# Patient Record
Sex: FEMALE | Race: WHITE | NOT HISPANIC OR LATINO | URBAN - METROPOLITAN AREA
[De-identification: names, ages, dates, MRNs, and addresses within clinical notes are randomized per-mention and may not be internally consistent; named-entity substitution may affect disease eponyms.]

---

## 2017-01-20 ENCOUNTER — OUTPATIENT (OUTPATIENT)
Dept: OUTPATIENT SERVICES | Facility: HOSPITAL | Age: 37
LOS: 1 days | End: 2017-01-20

## 2017-01-22 ENCOUNTER — OUTPATIENT (OUTPATIENT)
Dept: OUTPATIENT SERVICES | Facility: HOSPITAL | Age: 37
LOS: 1 days | End: 2017-01-22
Payer: COMMERCIAL

## 2017-01-22 PROCEDURE — 99214 OFFICE O/P EST MOD 30 MIN: CPT

## 2017-01-25 ENCOUNTER — OUTPATIENT (OUTPATIENT)
Dept: OUTPATIENT SERVICES | Facility: HOSPITAL | Age: 37
LOS: 1 days | End: 2017-01-25
Payer: COMMERCIAL

## 2017-01-25 LAB
ALBUMIN SERPL ELPH-MCNC: 2.9 G/DL — LOW (ref 3.4–5)
ALP SERPL-CCNC: 94 U/L — SIGNIFICANT CHANGE UP (ref 40–120)
ALT FLD-CCNC: 19 U/L — SIGNIFICANT CHANGE UP (ref 12–42)
AMYLASE P1 CFR SERPL: 57 U/L — SIGNIFICANT CHANGE UP (ref 25–115)
ANION GAP SERPL CALC-SCNC: 12 MMOL/L — SIGNIFICANT CHANGE UP (ref 9–16)
AST SERPL-CCNC: 21 U/L — SIGNIFICANT CHANGE UP (ref 15–37)
BILIRUB SERPL-MCNC: 0.5 MG/DL — SIGNIFICANT CHANGE UP (ref 0.2–1.2)
BUN SERPL-MCNC: 13 MG/DL — SIGNIFICANT CHANGE UP (ref 7–23)
CALCIUM SERPL-MCNC: 8.7 MG/DL — SIGNIFICANT CHANGE UP (ref 8.5–10.5)
CHLORIDE SERPL-SCNC: 103 MMOL/L — SIGNIFICANT CHANGE UP (ref 96–108)
CO2 SERPL-SCNC: 22 MMOL/L — SIGNIFICANT CHANGE UP (ref 22–31)
CREAT SERPL-MCNC: 0.5 MG/DL — SIGNIFICANT CHANGE UP (ref 0.5–1.3)
GLUCOSE SERPL-MCNC: 88 MG/DL — SIGNIFICANT CHANGE UP (ref 70–99)
HCT VFR BLD CALC: 36.3 % — SIGNIFICANT CHANGE UP (ref 34.5–45)
HGB BLD-MCNC: 12.3 G/DL — SIGNIFICANT CHANGE UP (ref 11.5–15.5)
LIDOCAIN IGE QN: 218 U/L — SIGNIFICANT CHANGE UP (ref 73–393)
MCHC RBC-ENTMCNC: 29.8 PG — SIGNIFICANT CHANGE UP (ref 27–34)
MCHC RBC-ENTMCNC: 33.9 G/DL — SIGNIFICANT CHANGE UP (ref 32–36)
MCV RBC AUTO: 87.9 FL — SIGNIFICANT CHANGE UP (ref 80–100)
PLATELET # BLD AUTO: 236 K/UL — SIGNIFICANT CHANGE UP (ref 150–400)
POTASSIUM SERPL-MCNC: 3.7 MMOL/L — SIGNIFICANT CHANGE UP (ref 3.5–5.3)
POTASSIUM SERPL-SCNC: 3.7 MMOL/L — SIGNIFICANT CHANGE UP (ref 3.5–5.3)
PROT SERPL-MCNC: 7.4 G/DL — SIGNIFICANT CHANGE UP (ref 6.4–8.2)
RBC # BLD: 4.13 M/UL — SIGNIFICANT CHANGE UP (ref 3.8–5.2)
RBC # FLD: 14.3 % — SIGNIFICANT CHANGE UP (ref 10.3–16.9)
SODIUM SERPL-SCNC: 137 MMOL/L — SIGNIFICANT CHANGE UP (ref 135–145)
WBC # BLD: 13.9 K/UL — HIGH (ref 3.8–10.5)
WBC # FLD AUTO: 13.9 K/UL — HIGH (ref 3.8–10.5)

## 2017-01-25 PROCEDURE — 80053 COMPREHEN METABOLIC PANEL: CPT

## 2017-01-25 PROCEDURE — 83690 ASSAY OF LIPASE: CPT

## 2017-01-25 PROCEDURE — 82150 ASSAY OF AMYLASE: CPT

## 2017-01-25 PROCEDURE — 85027 COMPLETE CBC AUTOMATED: CPT

## 2017-01-25 PROCEDURE — 99214 OFFICE O/P EST MOD 30 MIN: CPT

## 2017-01-25 PROCEDURE — 36415 COLL VENOUS BLD VENIPUNCTURE: CPT

## 2017-01-25 RX ORDER — SODIUM CHLORIDE 9 MG/ML
1000 INJECTION, SOLUTION INTRAVENOUS
Qty: 0 | Refills: 0 | Status: DISCONTINUED | OUTPATIENT
Start: 2017-01-25 | End: 2017-02-09

## 2017-02-04 ENCOUNTER — INPATIENT (INPATIENT)
Facility: HOSPITAL | Age: 37
LOS: 5 days | Discharge: HOME | End: 2017-02-10
Attending: OBSTETRICS & GYNECOLOGY | Admitting: OBSTETRICS & GYNECOLOGY
Payer: COMMERCIAL

## 2017-02-04 VITALS — HEIGHT: 64 IN | WEIGHT: 228.4 LBS

## 2017-02-04 RX ORDER — OXYTOCIN 10 UNIT/ML
1 VIAL (ML) INJECTION
Qty: 30 | Refills: 0 | Status: DISCONTINUED | OUTPATIENT
Start: 2017-02-04 | End: 2017-02-10

## 2017-02-04 RX ORDER — LEVOTHYROXINE SODIUM 125 MCG
1 TABLET ORAL
Qty: 0 | Refills: 0 | COMMUNITY

## 2017-02-04 RX ORDER — SODIUM CHLORIDE 9 MG/ML
1000 INJECTION, SOLUTION INTRAVENOUS
Qty: 0 | Refills: 0 | Status: DISCONTINUED | OUTPATIENT
Start: 2017-02-04 | End: 2017-02-05

## 2017-02-04 RX ORDER — CITRIC ACID/SODIUM CITRATE 300-500 MG
15 SOLUTION, ORAL ORAL EVERY 4 HOURS
Qty: 0 | Refills: 0 | Status: DISCONTINUED | OUTPATIENT
Start: 2017-02-04 | End: 2017-02-05

## 2017-02-04 RX ORDER — SODIUM CHLORIDE 9 MG/ML
2000 INJECTION, SOLUTION INTRAVENOUS ONCE
Qty: 0 | Refills: 0 | Status: DISCONTINUED | OUTPATIENT
Start: 2017-02-04 | End: 2017-02-05

## 2017-02-04 RX ORDER — OXYTOCIN 10 UNIT/ML
333.33 VIAL (ML) INJECTION
Qty: 20 | Refills: 0 | Status: DISCONTINUED | OUTPATIENT
Start: 2017-02-04 | End: 2017-02-05

## 2017-02-04 RX ADMIN — SODIUM CHLORIDE 125 MILLILITER(S): 9 INJECTION, SOLUTION INTRAVENOUS at 23:10

## 2017-02-04 NOTE — PATIENT PROFILE OB - PRENATAL LABORATORY TESTS, INFANT PROFILE
antibody screen/blood type antibody screen/VDRL/RPR/group B strep/blood type/rubella antibody screen/HIV/HBsAG/group B strep/rubella/blood type/VDRL/RPR

## 2017-02-05 LAB
BASOPHILS NFR BLD AUTO: 0.1 % — SIGNIFICANT CHANGE UP (ref 0–2)
BLD GP AB SCN SERPL QL: NEGATIVE — SIGNIFICANT CHANGE UP
BLD GP AB SCN SERPL QL: NEGATIVE — SIGNIFICANT CHANGE UP
EOSINOPHIL NFR BLD AUTO: 0.9 % — SIGNIFICANT CHANGE UP (ref 0–6)
HBV SURFACE AG SER-ACNC: SIGNIFICANT CHANGE UP
HCT VFR BLD CALC: 34.4 % — LOW (ref 34.5–45)
HGB BLD-MCNC: 11.8 G/DL — SIGNIFICANT CHANGE UP (ref 11.5–15.5)
HIV 1+2 AB+HIV1 P24 AG SERPL QL IA: SIGNIFICANT CHANGE UP
LYMPHOCYTES # BLD AUTO: 22.3 % — SIGNIFICANT CHANGE UP (ref 13–44)
MCHC RBC-ENTMCNC: 30.6 PG — SIGNIFICANT CHANGE UP (ref 27–34)
MCHC RBC-ENTMCNC: 34.3 G/DL — SIGNIFICANT CHANGE UP (ref 32–36)
MCV RBC AUTO: 89.4 FL — SIGNIFICANT CHANGE UP (ref 80–100)
MONOCYTES NFR BLD AUTO: 9.7 % — SIGNIFICANT CHANGE UP (ref 2–14)
NEUTROPHILS NFR BLD AUTO: 67 % — SIGNIFICANT CHANGE UP (ref 43–77)
PLATELET # BLD AUTO: 250 K/UL — SIGNIFICANT CHANGE UP (ref 150–400)
RBC # BLD: 3.85 M/UL — SIGNIFICANT CHANGE UP (ref 3.8–5.2)
RBC # FLD: 14.5 % — SIGNIFICANT CHANGE UP (ref 10.3–16.9)
RH IG SCN BLD-IMP: POSITIVE — SIGNIFICANT CHANGE UP
RH IG SCN BLD-IMP: POSITIVE — SIGNIFICANT CHANGE UP
WBC # BLD: 11.7 K/UL — HIGH (ref 3.8–10.5)
WBC # FLD AUTO: 11.7 K/UL — HIGH (ref 3.8–10.5)

## 2017-02-05 RX ORDER — ZOLPIDEM TARTRATE 10 MG/1
5 TABLET ORAL AT BEDTIME
Qty: 0 | Refills: 0 | Status: DISCONTINUED | OUTPATIENT
Start: 2017-02-05 | End: 2017-02-07

## 2017-02-05 RX ORDER — ONDANSETRON 8 MG/1
8 TABLET, FILM COATED ORAL ONCE
Qty: 0 | Refills: 0 | Status: COMPLETED | OUTPATIENT
Start: 2017-02-05 | End: 2017-02-05

## 2017-02-05 RX ORDER — FAMOTIDINE 10 MG/ML
20 INJECTION INTRAVENOUS EVERY 12 HOURS
Qty: 0 | Refills: 0 | Status: DISCONTINUED | OUTPATIENT
Start: 2017-02-05 | End: 2017-02-10

## 2017-02-05 RX ORDER — SODIUM CHLORIDE 9 MG/ML
1000 INJECTION, SOLUTION INTRAVENOUS
Qty: 0 | Refills: 0 | Status: DISCONTINUED | OUTPATIENT
Start: 2017-02-05 | End: 2017-02-06

## 2017-02-05 RX ADMIN — SODIUM CHLORIDE 125 MILLILITER(S): 9 INJECTION, SOLUTION INTRAVENOUS at 23:27

## 2017-02-05 RX ADMIN — Medication 1 MILLIUNIT(S)/MIN: at 00:34

## 2017-02-05 RX ADMIN — ZOLPIDEM TARTRATE 5 MILLIGRAM(S): 10 TABLET ORAL at 13:01

## 2017-02-05 RX ADMIN — FAMOTIDINE 20 MILLIGRAM(S): 10 INJECTION INTRAVENOUS at 23:21

## 2017-02-05 RX ADMIN — SODIUM CHLORIDE 125 MILLILITER(S): 9 INJECTION, SOLUTION INTRAVENOUS at 19:51

## 2017-02-05 RX ADMIN — ONDANSETRON 8 MILLIGRAM(S): 8 TABLET, FILM COATED ORAL at 02:24

## 2017-02-06 DIAGNOSIS — O47.1 FALSE LABOR AT OR AFTER 37 COMPLETED WEEKS OF GESTATION: ICD-10-CM

## 2017-02-06 LAB
HCT VFR BLD CALC: 23.3 % — LOW (ref 34.5–45)
HGB BLD-MCNC: 7.7 G/DL — LOW (ref 11.5–15.5)
MCHC RBC-ENTMCNC: 29.5 PG — SIGNIFICANT CHANGE UP (ref 27–34)
MCHC RBC-ENTMCNC: 33 G/DL — SIGNIFICANT CHANGE UP (ref 32–36)
MCV RBC AUTO: 89.3 FL — SIGNIFICANT CHANGE UP (ref 80–100)
PLATELET # BLD AUTO: 212 K/UL — SIGNIFICANT CHANGE UP (ref 150–400)
RBC # BLD: 2.61 M/UL — LOW (ref 3.8–5.2)
RBC # FLD: 13.6 % — SIGNIFICANT CHANGE UP (ref 10.3–16.9)
T PALLIDUM AB TITR SER: NEGATIVE — SIGNIFICANT CHANGE UP
WBC # BLD: 17.6 K/UL — HIGH (ref 3.8–10.5)
WBC # FLD AUTO: 17.6 K/UL — HIGH (ref 3.8–10.5)

## 2017-02-06 RX ORDER — NALOXONE HYDROCHLORIDE 4 MG/.1ML
0.1 SPRAY NASAL
Qty: 0 | Refills: 0 | Status: DISCONTINUED | OUTPATIENT
Start: 2017-02-06 | End: 2017-02-10

## 2017-02-06 RX ORDER — OXYTOCIN 10 UNIT/ML
41.67 VIAL (ML) INJECTION
Qty: 20 | Refills: 0 | Status: DISCONTINUED | OUTPATIENT
Start: 2017-02-06 | End: 2017-02-10

## 2017-02-06 RX ORDER — CEFAZOLIN SODIUM 1 G
2000 VIAL (EA) INJECTION ONCE
Qty: 0 | Refills: 0 | Status: COMPLETED | OUTPATIENT
Start: 2017-02-06 | End: 2017-02-06

## 2017-02-06 RX ORDER — SODIUM CHLORIDE 9 MG/ML
500 INJECTION, SOLUTION INTRAVENOUS ONCE
Qty: 0 | Refills: 0 | Status: COMPLETED | OUTPATIENT
Start: 2017-02-06 | End: 2017-02-06

## 2017-02-06 RX ORDER — ONDANSETRON 8 MG/1
4 TABLET, FILM COATED ORAL EVERY 6 HOURS
Qty: 0 | Refills: 0 | Status: DISCONTINUED | OUTPATIENT
Start: 2017-02-06 | End: 2017-02-10

## 2017-02-06 RX ORDER — MORPHINE SULFATE 50 MG/1
4 CAPSULE, EXTENDED RELEASE ORAL ONCE
Qty: 0 | Refills: 0 | Status: DISCONTINUED | OUTPATIENT
Start: 2017-02-06 | End: 2017-02-10

## 2017-02-06 RX ORDER — IBUPROFEN 200 MG
600 TABLET ORAL ONCE
Qty: 0 | Refills: 0 | Status: COMPLETED | OUTPATIENT
Start: 2017-02-06 | End: 2017-02-06

## 2017-02-06 RX ORDER — LANOLIN
1 OINTMENT (GRAM) TOPICAL
Qty: 0 | Refills: 0 | Status: DISCONTINUED | OUTPATIENT
Start: 2017-02-06 | End: 2017-02-10

## 2017-02-06 RX ORDER — DIPHENHYDRAMINE HCL 50 MG
25 CAPSULE ORAL EVERY 6 HOURS
Qty: 0 | Refills: 0 | Status: DISCONTINUED | OUTPATIENT
Start: 2017-02-06 | End: 2017-02-10

## 2017-02-06 RX ORDER — SODIUM CHLORIDE 9 MG/ML
1000 INJECTION, SOLUTION INTRAVENOUS
Qty: 0 | Refills: 0 | Status: DISCONTINUED | OUTPATIENT
Start: 2017-02-06 | End: 2017-02-10

## 2017-02-06 RX ORDER — DOCUSATE SODIUM 100 MG
100 CAPSULE ORAL
Qty: 0 | Refills: 0 | Status: DISCONTINUED | OUTPATIENT
Start: 2017-02-06 | End: 2017-02-10

## 2017-02-06 RX ORDER — OXYTOCIN 10 UNIT/ML
333.33 VIAL (ML) INJECTION
Qty: 20 | Refills: 0 | Status: COMPLETED | OUTPATIENT
Start: 2017-02-06 | End: 2017-02-06

## 2017-02-06 RX ORDER — LEVOTHYROXINE SODIUM 125 MCG
50 TABLET ORAL DAILY
Qty: 0 | Refills: 0 | Status: DISCONTINUED | OUTPATIENT
Start: 2017-02-06 | End: 2017-02-10

## 2017-02-06 RX ORDER — FERROUS SULFATE 325(65) MG
325 TABLET ORAL DAILY
Qty: 0 | Refills: 0 | Status: DISCONTINUED | OUTPATIENT
Start: 2017-02-06 | End: 2017-02-10

## 2017-02-06 RX ORDER — ACETAMINOPHEN 500 MG
650 TABLET ORAL EVERY 6 HOURS
Qty: 0 | Refills: 0 | Status: DISCONTINUED | OUTPATIENT
Start: 2017-02-06 | End: 2017-02-10

## 2017-02-06 RX ORDER — HEPARIN SODIUM 5000 [USP'U]/ML
5000 INJECTION INTRAVENOUS; SUBCUTANEOUS EVERY 12 HOURS
Qty: 0 | Refills: 0 | Status: DISCONTINUED | OUTPATIENT
Start: 2017-02-06 | End: 2017-02-10

## 2017-02-06 RX ORDER — SIMETHICONE 80 MG/1
80 TABLET, CHEWABLE ORAL EVERY 4 HOURS
Qty: 0 | Refills: 0 | Status: DISCONTINUED | OUTPATIENT
Start: 2017-02-06 | End: 2017-02-10

## 2017-02-06 RX ORDER — CITRIC ACID/SODIUM CITRATE 300-500 MG
30 SOLUTION, ORAL ORAL ONCE
Qty: 0 | Refills: 0 | Status: COMPLETED | OUTPATIENT
Start: 2017-02-06 | End: 2017-02-06

## 2017-02-06 RX ORDER — SODIUM CHLORIDE 9 MG/ML
1000 INJECTION, SOLUTION INTRAVENOUS
Qty: 0 | Refills: 0 | Status: DISCONTINUED | OUTPATIENT
Start: 2017-02-06 | End: 2017-02-06

## 2017-02-06 RX ORDER — GLYCERIN ADULT
1 SUPPOSITORY, RECTAL RECTAL AT BEDTIME
Qty: 0 | Refills: 0 | Status: DISCONTINUED | OUTPATIENT
Start: 2017-02-06 | End: 2017-02-10

## 2017-02-06 RX ORDER — IBUPROFEN 200 MG
600 TABLET ORAL EVERY 6 HOURS
Qty: 0 | Refills: 0 | Status: DISCONTINUED | OUTPATIENT
Start: 2017-02-06 | End: 2017-02-07

## 2017-02-06 RX ORDER — TETANUS TOXOID, REDUCED DIPHTHERIA TOXOID AND ACELLULAR PERTUSSIS VACCINE, ADSORBED 5; 2.5; 8; 8; 2.5 [IU]/.5ML; [IU]/.5ML; UG/.5ML; UG/.5ML; UG/.5ML
0.5 SUSPENSION INTRAMUSCULAR ONCE
Qty: 0 | Refills: 0 | Status: DISCONTINUED | OUTPATIENT
Start: 2017-02-06 | End: 2017-02-10

## 2017-02-06 RX ADMIN — Medication 1000 MILLIUNIT(S)/MIN: at 09:11

## 2017-02-06 RX ADMIN — SODIUM CHLORIDE 125 MILLILITER(S): 9 INJECTION, SOLUTION INTRAVENOUS at 18:03

## 2017-02-06 RX ADMIN — Medication 600 MILLIGRAM(S): at 14:25

## 2017-02-06 RX ADMIN — Medication 100 MILLIGRAM(S): at 04:55

## 2017-02-06 RX ADMIN — HEPARIN SODIUM 5000 UNIT(S): 5000 INJECTION INTRAVENOUS; SUBCUTANEOUS at 20:53

## 2017-02-06 RX ADMIN — Medication 600 MILLIGRAM(S): at 08:05

## 2017-02-06 RX ADMIN — Medication 600 MILLIGRAM(S): at 09:10

## 2017-02-06 RX ADMIN — Medication 125 MILLIUNIT(S)/MIN: at 09:15

## 2017-02-06 RX ADMIN — Medication 600 MILLIGRAM(S): at 13:51

## 2017-02-06 RX ADMIN — SODIUM CHLORIDE 1000 MILLILITER(S): 9 INJECTION, SOLUTION INTRAVENOUS at 09:40

## 2017-02-06 RX ADMIN — Medication 30 MILLILITER(S): at 04:56

## 2017-02-06 RX ADMIN — Medication 125 MILLIUNIT(S)/MIN: at 09:12

## 2017-02-07 RX ORDER — ZOLPIDEM TARTRATE 10 MG/1
5 TABLET ORAL AT BEDTIME
Qty: 0 | Refills: 0 | Status: DISCONTINUED | OUTPATIENT
Start: 2017-02-07 | End: 2017-02-10

## 2017-02-07 RX ORDER — KETOROLAC TROMETHAMINE 30 MG/ML
30 SYRINGE (ML) INJECTION EVERY 6 HOURS
Qty: 0 | Refills: 0 | Status: DISCONTINUED | OUTPATIENT
Start: 2017-02-07 | End: 2017-02-09

## 2017-02-07 RX ORDER — ACETAMINOPHEN 500 MG
1000 TABLET ORAL ONCE
Qty: 0 | Refills: 0 | Status: COMPLETED | OUTPATIENT
Start: 2017-02-07 | End: 2017-02-07

## 2017-02-07 RX ADMIN — SIMETHICONE 80 MILLIGRAM(S): 80 TABLET, CHEWABLE ORAL at 07:19

## 2017-02-07 RX ADMIN — SODIUM CHLORIDE 125 MILLILITER(S): 9 INJECTION, SOLUTION INTRAVENOUS at 04:50

## 2017-02-07 RX ADMIN — HEPARIN SODIUM 5000 UNIT(S): 5000 INJECTION INTRAVENOUS; SUBCUTANEOUS at 21:37

## 2017-02-07 RX ADMIN — Medication 30 MILLIGRAM(S): at 21:51

## 2017-02-07 RX ADMIN — Medication 600 MILLIGRAM(S): at 09:57

## 2017-02-07 RX ADMIN — ZOLPIDEM TARTRATE 5 MILLIGRAM(S): 10 TABLET ORAL at 22:59

## 2017-02-07 RX ADMIN — ZOLPIDEM TARTRATE 5 MILLIGRAM(S): 10 TABLET ORAL at 00:46

## 2017-02-07 RX ADMIN — Medication 50 MICROGRAM(S): at 06:09

## 2017-02-07 RX ADMIN — Medication 600 MILLIGRAM(S): at 14:40

## 2017-02-07 RX ADMIN — HEPARIN SODIUM 5000 UNIT(S): 5000 INJECTION INTRAVENOUS; SUBCUTANEOUS at 09:31

## 2017-02-07 RX ADMIN — SIMETHICONE 80 MILLIGRAM(S): 80 TABLET, CHEWABLE ORAL at 00:17

## 2017-02-07 RX ADMIN — Medication 100 MILLIGRAM(S): at 09:28

## 2017-02-07 RX ADMIN — Medication 30 MILLIGRAM(S): at 22:20

## 2017-02-07 RX ADMIN — Medication 600 MILLIGRAM(S): at 09:30

## 2017-02-07 RX ADMIN — Medication 1 TABLET(S): at 09:29

## 2017-02-07 RX ADMIN — Medication 600 MILLIGRAM(S): at 15:29

## 2017-02-07 RX ADMIN — Medication 100 MILLIGRAM(S): at 00:17

## 2017-02-07 NOTE — PROGRESS NOTE ADULT - SUBJECTIVE AND OBJECTIVE BOX
Patient evaluated at bedside.   She reports pain is not well controlled, currently taking 2 percocet q6hrs  She denies heavy vaginal bleeding.  She has been OOBTC but not yet walking. fraire removed while at bedside. Pt is passing gas but notes sharp right shoulder pain. She is tolerating clear liquid diet    Physical Exam:  Vital Signs Last 24 Hrs  T(C): 36.8, Max: 36.8 (02-07 @ 01:55)  T(F): 98.2, Max: 98.2 (02-07 @ 01:55)  HR: 78 (73 - 95)  BP: 100/62 (94/62 - 109/56)  BP(mean): --  RR: 16 (16 - 20)  SpO2: 97% (95% - 97%)    GA: NAD, A+0 x 3  Abd: soft, nontender, nondistended, no rebound or guarding, incision clean, dry and intact, uterus firm at midline, 0  fb below umbilicus  : lochia WNL  Fraire: removed while at bedside  Extremities: no calf tenderness                            7.7    17.6  )-----------( 212      ( 06 Feb 2017 20:37 )             23.3

## 2017-02-07 NOTE — PROGRESS NOTE ADULT - SUBJECTIVE AND OBJECTIVE BOX
At patient bedside. patient reports significant incisional pain worsened by movement when getting up that is not alleviated by percocet or motrin.     VSS  Patient in NAD, Alert oriented  Abdomen distended, soft, non tender to palpation, no incisional tenderness  LOchia normal     Plan to get pain management consult per Dr Morin

## 2017-02-07 NOTE — PROGRESS NOTE ADULT - ASSESSMENT
A/P  35yo   s/p c/s, POD #1  ,stable  1. Pain: contine percocet 2tabs q6hrs, add motrin 600 q6 in between percocet  2. GI: advance to regular as tolerated  3. : TOV this AM  4. DVT prophylaxis: SQH, ambulation  5. Dispo: POD 3 or 4

## 2017-02-08 RX ADMIN — Medication 30 MILLIGRAM(S): at 10:06

## 2017-02-08 RX ADMIN — Medication 30 MILLIGRAM(S): at 03:46

## 2017-02-08 RX ADMIN — HEPARIN SODIUM 5000 UNIT(S): 5000 INJECTION INTRAVENOUS; SUBCUTANEOUS at 22:13

## 2017-02-08 RX ADMIN — Medication 325 MILLIGRAM(S): at 13:49

## 2017-02-08 RX ADMIN — HEPARIN SODIUM 5000 UNIT(S): 5000 INJECTION INTRAVENOUS; SUBCUTANEOUS at 10:06

## 2017-02-08 RX ADMIN — Medication 30 MILLIGRAM(S): at 16:55

## 2017-02-08 RX ADMIN — ZOLPIDEM TARTRATE 5 MILLIGRAM(S): 10 TABLET ORAL at 22:22

## 2017-02-08 RX ADMIN — Medication 30 MILLIGRAM(S): at 10:40

## 2017-02-08 RX ADMIN — Medication 30 MILLIGRAM(S): at 04:06

## 2017-02-08 RX ADMIN — SIMETHICONE 80 MILLIGRAM(S): 80 TABLET, CHEWABLE ORAL at 10:06

## 2017-02-08 RX ADMIN — Medication 100 MILLIGRAM(S): at 22:14

## 2017-02-08 RX ADMIN — Medication 50 MICROGRAM(S): at 07:01

## 2017-02-08 RX ADMIN — Medication 30 MILLIGRAM(S): at 22:16

## 2017-02-08 RX ADMIN — Medication 30 MILLIGRAM(S): at 22:45

## 2017-02-08 RX ADMIN — Medication 30 MILLIGRAM(S): at 16:25

## 2017-02-08 RX ADMIN — Medication 1 TABLET(S): at 13:49

## 2017-02-08 NOTE — DISCHARGE NOTE OB - CARE PROVIDER_API CALL
Cara Castañeda), Obstetrics and Gynecology  [facility] [place] [address]  Phone: [phone]  Fax: [phone]

## 2017-02-08 NOTE — DIETITIAN INITIAL EVALUATION ADULT. - ENERGY NEEDS
pre-pregnancy wt: 160# (72kg), last wt at pregnancy: 228# (103kg), IBW:  54.5kg  %IBW: 132%  BMI:  27.2  IBW utilized for needs as pt's pre-pregnancy wt > 120% of IBW; needs per post op

## 2017-02-08 NOTE — DIETITIAN INITIAL EVALUATION ADULT. - PERTINENT MEDS FT
heparin, colace, ferrous sulfate, glycerin supp., lanolin ointment, synthroid, narcan, zofran, prenatal MVI,. pitocin

## 2017-02-08 NOTE — DIETITIAN INITIAL EVALUATION ADULT. - OTHER INFO
Pt s/p ; had c/o severe pain, pain management consulted, pt reports managable pain at this time; tolerates diet w/ > 75% po intake, denies N/V/D/C; w/ NKFA; not breastfeeding currently; gained 68# above desired amount of 15-25# per pre-pregnancy BMI 27.2; well balanced diet w/ out excess calories suggested. Skin w/ surgical incision, w/ no edema.

## 2017-02-08 NOTE — CONSULT NOTE ADULT - SUBJECTIVE AND OBJECTIVE BOX
Pain Management Consult Note - [facility] [phone]    Chief Complaint:  Abdominal pain S/P     HPI:  35 y/o female with abdominal pain S/P  on Monday.  Pt. states prior to , she was not on any pain medications at home.  Pt. is currently on Toradol 30 mg IV q6h (which was started last night) and Percocet 5/325 2 tabs po q6h prn.  States percocet is helpful, but doesn't feel it lasts 6 hours.        Pain is _x__ sharp ____dull ___burning ___achy ___ Intensity: ____ mild ___mod ___severe     Location __x__surgical site ____cervical _____lumbar ____abd ____upper ext____lower ext    Worse with __x_activity ___x_movement _____physical therapy___ Rest    Improved with __x__medication _x___rest ____physical therapy    ROS: Const:  __-_febrile   Eyes:___ENT:_-__CV: _-__chest pain  Resp: _-___sob  GI:_-__nausea _-__vomiting _+__abd pain ___npo ___clears _+_full diet __bm  :___ Musk: _-__pain ___spasm  Skin:_-__ Neuro:  _-__hbqeampk__-_slxdyayjf__-_ numbness _-__weakness ___paresth  Psych:__anxiety  Endo:__+_ Heme:_-__Allergy:_NKDA________, ___all others reviewed and negative    PAST MEDICAL & SURGICAL HISTORY: no past medical history.  Has had tonsillectomy, breast reduction and wisdom teeth removed.      SH: _-_Tobacco   __-_Alcohol                          FH:FAMILY HISTORY:  grandfather-pancreatic cancer      oxytocin Infusion 1milliUNIT(s)/Min IV Continuous <Continuous>  fentaNYL (2 MICROgram(s)/mL) + BUpivacaine 0.1% in 0.9% Sodium Chloride PCEA 250milliLiter(s) Epidural PCA Continuous  famotidine    Tablet 20milliGRAM(s) Oral every 12 hours  oxytocin Infusion 41.667milliUNIT(s)/Min IV Continuous <Continuous>  morphine PF Epidural 4milliGRAM(s) Epidural once  lactated ringers. 1000milliLiter(s) IV Continuous <Continuous>  acetaminophen   Tablet 650milliGRAM(s) Oral every 6 hours PRN  oxyCODONE  5 mG/acetaminophen 325 mG 1Tablet(s) Oral every 3 hours PRN  oxyCODONE  5 mG/acetaminophen 325 mG 2Tablet(s) Oral every 6 hours PRN  simethicone 80milliGRAM(s) Chew every 4 hours PRN  diphenhydrAMINE   Capsule 25milliGRAM(s) Oral every 6 hours PRN  diphtheria/tetanus/pertussis (acellular) Vaccine (ADAcel) 0.5milliLiter(s) IntraMuscular once  oxytocin Infusion 41.667milliUNIT(s)/Min IV Continuous <Continuous>  glycerin Suppository - Adult 1Suppository(s) Rectal at bedtime PRN  docusate sodium 100milliGRAM(s) Oral two times a day PRN  lanolin Ointment 1Application(s) Topical every 3 hours PRN  ferrous    sulfate 325milliGRAM(s) Oral daily  prenatal multivitamin 1Tablet(s) Oral daily  heparin  Injectable 5000Unit(s) SubCutaneous every 12 hours  naloxone Injectable 0.1milliGRAM(s) IV Push every 3 minutes PRN  ondansetron Injectable 4milliGRAM(s) IV Push every 6 hours PRN  levothyroxine 50MICROGram(s) Oral daily  acetaminophen  IVPB. 1000milliGRAM(s) IV Intermittent once  ketorolac   Injectable 30milliGRAM(s) IV Push every 6 hours  zolpidem 5milliGRAM(s) Oral at bedtime PRN      T(C): 36.5, Max: 36.7 ( @ 15:33)  HR: 78 (78 - 89)  BP: 108/78 (104/72 - 108/78)  RR: 18 (18 - 18)  SpO2: 97% (97% - 97%)  Wt(kg): --    T(C): 36.5, Max: 36.7 ( @ 15:33)  HR: 78 (78 - 89)  BP: 108/78 (104/72 - 108/78)  RR: 18 (18 - 18)  SpO2: 97% (97% - 97%)  Wt(kg): --    T(C): 36.5, Max: 36.7 (02-07 @ 15:33)  HR: 78 (78 - 89)  BP: 108/78 (104/72 - 108/78)  RR: 18 (18 - 18)  SpO2: 97% (97% - 97%)  Wt(kg): --    PHYSICAL EXAM:  Gen Appearance: _x__no acute distress __x_appropriate        Neuro: _x__SILT feet_x___ EOM Intact Psych: AAOX3__, _x__mood/affect appropriate        Eyes: __x_conjunctiva WNL  ___x__ Pupils equal and round        ENT: _x__ears and nose atraumatic__x_ Hearing grossly intact        Neck: ___trachea midline, no visible masses ___thyroid without palpable mass    Resp: __x_Nml WOB____No tactile fremitus ___clear to auscultation    Cardio: _x__extremities free from edema ____pedal pulses palpable    GI/Abdomen: _x__soft ___x__ tender______Nondistended_____HSM    Lymphatic: ___no palpable nodes in neck  ___no palpable nodes calves and feet    Skin/Wound: ___Incision, ___Dressing c/d/i,   ____surrounding tissues soft,  ___drain/chest tube present____    Muscular: EHL __5_/5  Gastrocnemius___/5    _x__absent clubbing/cyanosis      ASSESSMENT: This is a 36y old Female with a history of   abdominal pain S/P  that is not well controlled on the current regimen.      Recommended Treatment PLAN: Pain Management Consult Note - [facility] [phone]    Chief Complaint:  Abdominal pain S/P     HPI:  37 y/o female with abdominal pain S/P  on Monday.  Pt. states prior to , she was not on any pain medications at home.  Pt. is currently on Toradol 30 mg IV q6h (which was started last night) and Percocet 5/325 2 tabs po q6h prn.  States percocet is helpful, but doesn't feel it lasts 6 hours.        Pain is _x__ sharp ____dull ___burning ___achy ___ Intensity: ____ mild ___mod ___severe     Location __x__surgical site ____cervical _____lumbar ____abd ____upper ext____lower ext    Worse with __x_activity ___x_movement _____physical therapy___ Rest    Improved with __x__medication _x___rest ____physical therapy    ROS: Const:  __-_febrile   Eyes:___ENT:_-__CV: _-__chest pain  Resp: _-___sob  GI:_-__nausea _-__vomiting _+__abd pain ___npo ___clears _+_full diet __bm  :___ Musk: _-__pain ___spasm  Skin:_-__ Neuro:  _-__hivapdgn__-_zaqipyoyo__-_ numbness _-__weakness ___paresth  Psych:__anxiety  Endo:__+_ Heme:_-__Allergy:_NKDA________, ___all others reviewed and negative    PAST MEDICAL & SURGICAL HISTORY: no past medical history.  Has had tonsillectomy, breast reduction and wisdom teeth removed.      SH: _-_Tobacco   __-_Alcohol                          FH:FAMILY HISTORY:  grandfather-pancreatic cancer      oxytocin Infusion 1milliUNIT(s)/Min IV Continuous <Continuous>  fentaNYL (2 MICROgram(s)/mL) + BUpivacaine 0.1% in 0.9% Sodium Chloride PCEA 250milliLiter(s) Epidural PCA Continuous  famotidine    Tablet 20milliGRAM(s) Oral every 12 hours  oxytocin Infusion 41.667milliUNIT(s)/Min IV Continuous <Continuous>  morphine PF Epidural 4milliGRAM(s) Epidural once  lactated ringers. 1000milliLiter(s) IV Continuous <Continuous>  acetaminophen   Tablet 650milliGRAM(s) Oral every 6 hours PRN  oxyCODONE  5 mG/acetaminophen 325 mG 1Tablet(s) Oral every 3 hours PRN  oxyCODONE  5 mG/acetaminophen 325 mG 2Tablet(s) Oral every 6 hours PRN  simethicone 80milliGRAM(s) Chew every 4 hours PRN  diphenhydrAMINE   Capsule 25milliGRAM(s) Oral every 6 hours PRN  diphtheria/tetanus/pertussis (acellular) Vaccine (ADAcel) 0.5milliLiter(s) IntraMuscular once  oxytocin Infusion 41.667milliUNIT(s)/Min IV Continuous <Continuous>  glycerin Suppository - Adult 1Suppository(s) Rectal at bedtime PRN  docusate sodium 100milliGRAM(s) Oral two times a day PRN  lanolin Ointment 1Application(s) Topical every 3 hours PRN  ferrous    sulfate 325milliGRAM(s) Oral daily  prenatal multivitamin 1Tablet(s) Oral daily  heparin  Injectable 5000Unit(s) SubCutaneous every 12 hours  naloxone Injectable 0.1milliGRAM(s) IV Push every 3 minutes PRN  ondansetron Injectable 4milliGRAM(s) IV Push every 6 hours PRN  levothyroxine 50MICROGram(s) Oral daily  acetaminophen  IVPB. 1000milliGRAM(s) IV Intermittent once  ketorolac   Injectable 30milliGRAM(s) IV Push every 6 hours  zolpidem 5milliGRAM(s) Oral at bedtime PRN      T(C): 36.5, Max: 36.7 ( @ 15:33)  HR: 78 (78 - 89)  BP: 108/78 (104/72 - 108/78)  RR: 18 (18 - 18)  SpO2: 97% (97% - 97%)  Wt(kg): --    T(C): 36.5, Max: 36.7 ( @ 15:33)  HR: 78 (78 - 89)  BP: 108/78 (104/72 - 108/78)  RR: 18 (18 - 18)  SpO2: 97% (97% - 97%)  Wt(kg): --    T(C): 36.5, Max: 36.7 (02-07 @ 15:33)  HR: 78 (78 - 89)  BP: 108/78 (104/72 - 108/78)  RR: 18 (18 - 18)  SpO2: 97% (97% - 97%)  Wt(kg): --    PHYSICAL EXAM:  Gen Appearance: _x__no acute distress __x_appropriate        Neuro: _x__SILT feet_x___ EOM Intact Psych: AAOX3__, _x__mood/affect appropriate        Eyes: __x_conjunctiva WNL  ___x__ Pupils equal and round        ENT: _x__ears and nose atraumatic__x_ Hearing grossly intact        Neck: ___trachea midline, no visible masses ___thyroid without palpable mass    Resp: __x_Nml WOB____No tactile fremitus ___clear to auscultation    Cardio: _x__extremities free from edema ____pedal pulses palpable    GI/Abdomen: _x__soft ___x__ tender______Nondistended_____HSM    Lymphatic: ___no palpable nodes in neck  ___no palpable nodes calves and feet    Skin/Wound: ___Incision, ___Dressing c/d/i,   ____surrounding tissues soft,  ___drain/chest tube present____    Muscular: EHL __5_/5  Gastrocnemius___/5    _x__absent clubbing/cyanosis      ASSESSMENT: This is a 36y old Female with a history of   abdominal pain S/P  that is not well controlled on the current regimen.      Recommended Treatment PLAN:  1. Suggest percocet 5/325 1-2 tab po q4h prn  2.  Suggest continue toradol 30 mg IV q6h

## 2017-02-08 NOTE — DISCHARGE NOTE OB - PATIENT PORTAL LINK FT
“You can access the [employer] Patient Portal, offered by [facility], by registering with the following website: [email or web address]”

## 2017-02-08 NOTE — DISCHARGE NOTE OB - CARE PLAN
Principal Discharge DX:	Normal postpartum course  Goal:	enjoy the baby!  Instructions for follow-up, activity and diet:	pelvic rest, regular diet, see me in 2 weeks

## 2017-02-09 RX ORDER — IBUPROFEN 200 MG
600 TABLET ORAL EVERY 6 HOURS
Qty: 0 | Refills: 0 | Status: DISCONTINUED | OUTPATIENT
Start: 2017-02-09 | End: 2017-02-10

## 2017-02-09 RX ORDER — RANITIDINE HYDROCHLORIDE 150 MG/1
1 TABLET, FILM COATED ORAL
Qty: 0 | Refills: 0 | COMMUNITY

## 2017-02-09 RX ORDER — METFORMIN HYDROCHLORIDE 850 MG/1
1 TABLET ORAL
Qty: 0 | Refills: 0 | COMMUNITY

## 2017-02-09 RX ORDER — LEVOTHYROXINE SODIUM 125 MCG
1 TABLET ORAL
Qty: 0 | Refills: 0 | COMMUNITY

## 2017-02-09 RX ADMIN — Medication 30 MILLIGRAM(S): at 09:40

## 2017-02-09 RX ADMIN — Medication 325 MILLIGRAM(S): at 12:44

## 2017-02-09 RX ADMIN — ZOLPIDEM TARTRATE 5 MILLIGRAM(S): 10 TABLET ORAL at 23:33

## 2017-02-09 RX ADMIN — HEPARIN SODIUM 5000 UNIT(S): 5000 INJECTION INTRAVENOUS; SUBCUTANEOUS at 09:00

## 2017-02-09 RX ADMIN — Medication 100 MILLIGRAM(S): at 12:44

## 2017-02-09 RX ADMIN — Medication 30 MILLIGRAM(S): at 08:51

## 2017-02-09 RX ADMIN — Medication 600 MILLIGRAM(S): at 19:08

## 2017-02-09 RX ADMIN — Medication 50 MICROGRAM(S): at 07:06

## 2017-02-09 RX ADMIN — Medication 1 TABLET(S): at 12:44

## 2017-02-09 RX ADMIN — Medication 600 MILLIGRAM(S): at 19:35

## 2017-02-09 NOTE — PROGRESS NOTE ADULT - SUBJECTIVE AND OBJECTIVE BOX
Patient evaluated at bedside.   She reports pain is well controlled with IV toradol  She denies heavy vaginal bleeding.  She has been ambulating without assistance, voiding spontaneously, passing gas, tolerating regular diet.    Physical Exam:  Vital Signs Last 24 Hrs  T(C): 36.7, Max: 36.7 (02-08 @ 13:53)  T(F): 98, Max: 98.1 (02-08 @ 13:53)  HR: 75 (62 - 75)  BP: 123/77 (114/76 - 123/77)  BP(mean): --  RR: 18 (18 - 18)  SpO2: 98% (97% - 98%)    GA: NAD, A+0 x 3  Abd: soft, nontender, nondistended, no rebound or guarding, incision clean, dry and intact, uterus firm at midline, 2  fb below umbilicus  : lochia WNL  Extremities: no calf tenderness

## 2017-02-09 NOTE — PROGRESS NOTE ADULT - ASSESSMENT
A/P  35yo s/p c/s, POD #2  ,stable  1. Pain: well controlled on PO pain meds  2. GI: regular diet  3. : voiding  4. DVT prophylaxis: SQH, ambulation  5. Dispo: POD 3 or 4

## 2017-02-10 VITALS
OXYGEN SATURATION: 100 % | SYSTOLIC BLOOD PRESSURE: 119 MMHG | RESPIRATION RATE: 16 BRPM | TEMPERATURE: 98 F | HEART RATE: 79 BPM | DIASTOLIC BLOOD PRESSURE: 78 MMHG

## 2017-02-10 PROCEDURE — 87340 HEPATITIS B SURFACE AG IA: CPT

## 2017-02-10 PROCEDURE — 86850 RBC ANTIBODY SCREEN: CPT

## 2017-02-10 PROCEDURE — 87389 HIV-1 AG W/HIV-1&-2 AB AG IA: CPT

## 2017-02-10 PROCEDURE — 86780 TREPONEMA PALLIDUM: CPT

## 2017-02-10 PROCEDURE — 86901 BLOOD TYPING SEROLOGIC RH(D): CPT

## 2017-02-10 PROCEDURE — 88307 TISSUE EXAM BY PATHOLOGIST: CPT

## 2017-02-10 PROCEDURE — 85027 COMPLETE CBC AUTOMATED: CPT

## 2017-02-10 PROCEDURE — 85025 COMPLETE CBC W/AUTO DIFF WBC: CPT

## 2017-02-10 PROCEDURE — C1765: CPT

## 2017-02-10 PROCEDURE — 36415 COLL VENOUS BLD VENIPUNCTURE: CPT

## 2017-02-10 PROCEDURE — 86900 BLOOD TYPING SEROLOGIC ABO: CPT

## 2017-02-10 RX ADMIN — Medication 1 TABLET(S): at 11:03

## 2017-02-10 RX ADMIN — Medication 100 MILLIGRAM(S): at 11:04

## 2017-02-10 RX ADMIN — Medication 50 MICROGRAM(S): at 06:54

## 2017-02-10 RX ADMIN — Medication 325 MILLIGRAM(S): at 11:03

## 2017-02-10 NOTE — PROGRESS NOTE ADULT - ASSESSMENT
A/P  37yo   s/p c/s, POD #4  ,stable  1. Pain: well controlled on PO pain meds  2. GI: regular  3. : voiding  4. DVT prophylaxis: SQH, ambualtion  5. Dispo: POD 4

## 2017-02-10 NOTE — PROGRESS NOTE ADULT - SUBJECTIVE AND OBJECTIVE BOX
Patient evaluated at bedside.   She reports pain is well controlled with IV toradol  She denies heavy vaginal bleeding.  She has been ambulating without assistance, voiding spontaneously, passing gas, tolerating regular diet.    Physical Exam:  Vital Signs Last 24 Hrs  T(C): 36.6, Max: 36.8 (02-09 @ 10:33)  T(F): 97.9, Max: 98.2 (02-09 @ 10:33)  HR: 83 (74 - 83)  BP: 115/80 (90/60 - 122/75)  BP(mean): --  RR: 18 (18 - 19)  SpO2: 98% (97% - 98%)    GA: NAD, A+0 x 3  Abd: soft, nontender, nondistended, no rebound or guarding, incision clean, dry and intact, uterus firm at midline, 1 fb below umbilicus  : lochia WNL  Extremities: no calf tenderness

## 2017-02-11 LAB — SURGICAL PATHOLOGY STUDY: SIGNIFICANT CHANGE UP

## 2017-02-15 DIAGNOSIS — E03.9 HYPOTHYROIDISM, UNSPECIFIED: ICD-10-CM

## 2017-02-15 DIAGNOSIS — Z22.4 CARRIER OF INFECTIONS WITH A PREDOMINANTLY SEXUAL MODE OF TRANSMISSION: ICD-10-CM

## 2017-02-15 DIAGNOSIS — O32.4XX0 MATERNAL CARE FOR HIGH HEAD AT TERM, NOT APPLICABLE OR UNSPECIFIED: ICD-10-CM

## 2017-02-15 DIAGNOSIS — Z34.93 ENCOUNTER FOR SUPERVISION OF NORMAL PREGNANCY, UNSPECIFIED, THIRD TRIMESTER: ICD-10-CM

## 2017-02-15 DIAGNOSIS — Z3A.40 40 WEEKS GESTATION OF PREGNANCY: ICD-10-CM

## 2017-04-20 ENCOUNTER — OUTPATIENT (OUTPATIENT)
Dept: OUTPATIENT SERVICES | Facility: HOSPITAL | Age: 37
LOS: 1 days | End: 2017-04-20

## 2017-05-09 ENCOUNTER — OUTPATIENT (OUTPATIENT)
Dept: EMERGENCY DEPT | Facility: HOSPITAL | Age: 37
LOS: 1 days | Discharge: HOME | End: 2017-05-09
Payer: COMMERCIAL

## 2017-05-09 VITALS
OXYGEN SATURATION: 96 % | RESPIRATION RATE: 16 BRPM | HEART RATE: 65 BPM | SYSTOLIC BLOOD PRESSURE: 101 MMHG | DIASTOLIC BLOOD PRESSURE: 55 MMHG

## 2017-05-09 VITALS
TEMPERATURE: 98 F | HEART RATE: 79 BPM | HEIGHT: 64 IN | RESPIRATION RATE: 18 BRPM | OXYGEN SATURATION: 99 % | DIASTOLIC BLOOD PRESSURE: 69 MMHG | SYSTOLIC BLOOD PRESSURE: 101 MMHG | WEIGHT: 175.05 LBS

## 2017-05-09 DIAGNOSIS — N72 INFLAMMATORY DISEASE OF CERVIX UTERI: ICD-10-CM

## 2017-05-09 DIAGNOSIS — D64.9 ANEMIA, UNSPECIFIED: ICD-10-CM

## 2017-05-09 LAB
ANION GAP SERPL CALC-SCNC: 8 MMOL/L — LOW (ref 9–16)
APTT BLD: 29.7 SEC — SIGNIFICANT CHANGE UP (ref 27.5–37.4)
BLD GP AB SCN SERPL QL: NEGATIVE — SIGNIFICANT CHANGE UP
BUN SERPL-MCNC: 10 MG/DL — SIGNIFICANT CHANGE UP (ref 7–23)
CALCIUM SERPL-MCNC: 8.9 MG/DL — SIGNIFICANT CHANGE UP (ref 8.5–10.5)
CHLORIDE SERPL-SCNC: 105 MMOL/L — SIGNIFICANT CHANGE UP (ref 96–108)
CO2 SERPL-SCNC: 24 MMOL/L — SIGNIFICANT CHANGE UP (ref 22–31)
CREAT SERPL-MCNC: 0.68 MG/DL — SIGNIFICANT CHANGE UP (ref 0.5–1.3)
GLUCOSE SERPL-MCNC: 95 MG/DL — SIGNIFICANT CHANGE UP (ref 70–99)
HCT VFR BLD CALC: 32.6 % — LOW (ref 34.5–45)
HGB BLD-MCNC: 10.5 G/DL — LOW (ref 11.5–15.5)
INR BLD: 1.15 — SIGNIFICANT CHANGE UP (ref 0.88–1.16)
MCHC RBC-ENTMCNC: 24.6 PG — LOW (ref 27–34)
MCHC RBC-ENTMCNC: 32.2 G/DL — SIGNIFICANT CHANGE UP (ref 32–36)
MCV RBC AUTO: 76.5 FL — LOW (ref 80–100)
PLATELET # BLD AUTO: 365 K/UL — SIGNIFICANT CHANGE UP (ref 150–400)
POTASSIUM SERPL-MCNC: 3.5 MMOL/L — SIGNIFICANT CHANGE UP (ref 3.5–5.3)
POTASSIUM SERPL-SCNC: 3.5 MMOL/L — SIGNIFICANT CHANGE UP (ref 3.5–5.3)
PROTHROM AB SERPL-ACNC: 12.8 SEC — HIGH (ref 9.8–12.7)
RBC # BLD: 4.26 M/UL — SIGNIFICANT CHANGE UP (ref 3.8–5.2)
RBC # FLD: 15.8 % — SIGNIFICANT CHANGE UP (ref 10.3–16.9)
RH IG SCN BLD-IMP: POSITIVE — SIGNIFICANT CHANGE UP
SODIUM SERPL-SCNC: 137 MMOL/L — SIGNIFICANT CHANGE UP (ref 135–145)
WBC # BLD: 7.6 K/UL — SIGNIFICANT CHANGE UP (ref 3.8–10.5)
WBC # FLD AUTO: 7.6 K/UL — SIGNIFICANT CHANGE UP (ref 3.8–10.5)

## 2017-05-09 PROCEDURE — 99284 EMERGENCY DEPT VISIT MOD MDM: CPT

## 2017-05-09 RX ORDER — METOCLOPRAMIDE HCL 10 MG
10 TABLET ORAL ONCE
Qty: 0 | Refills: 0 | Status: DISCONTINUED | OUTPATIENT
Start: 2017-05-09 | End: 2017-05-09

## 2017-05-09 RX ORDER — ONDANSETRON 8 MG/1
4 TABLET, FILM COATED ORAL EVERY 6 HOURS
Qty: 0 | Refills: 0 | Status: DISCONTINUED | OUTPATIENT
Start: 2017-05-09 | End: 2017-05-09

## 2017-05-09 RX ORDER — MORPHINE SULFATE 50 MG/1
4 CAPSULE, EXTENDED RELEASE ORAL EVERY 4 HOURS
Qty: 0 | Refills: 0 | Status: DISCONTINUED | OUTPATIENT
Start: 2017-05-09 | End: 2017-05-09

## 2017-05-09 RX ORDER — KETOROLAC TROMETHAMINE 30 MG/ML
30 SYRINGE (ML) INJECTION ONCE
Qty: 0 | Refills: 0 | Status: DISCONTINUED | OUTPATIENT
Start: 2017-05-09 | End: 2017-05-09

## 2017-05-09 RX ORDER — SODIUM CHLORIDE 9 MG/ML
1000 INJECTION, SOLUTION INTRAVENOUS ONCE
Qty: 0 | Refills: 0 | Status: COMPLETED | OUTPATIENT
Start: 2017-05-09 | End: 2017-05-09

## 2017-05-09 RX ADMIN — Medication 30 MILLIGRAM(S): at 18:57

## 2017-05-09 RX ADMIN — Medication 30 MILLIGRAM(S): at 19:17

## 2017-05-09 RX ADMIN — SODIUM CHLORIDE 500 MILLILITER(S): 9 INJECTION, SOLUTION INTRAVENOUS at 17:03

## 2017-05-09 NOTE — ED ADULT NURSE NOTE - CHIEF COMPLAINT QUOTE
Patient sent in by Dr. Morin for DNC. Patient reports she is 3 months post partum and has heavy vaginal bleeding.

## 2017-05-09 NOTE — H&P ADULT - ASSESSMENT
35 y/o postpartum day 3 after  delivery  - plan for diagnostic hysteroscopy, dilation and curretage   - clinically and hemodynamically stable  - LR   - NPO

## 2017-05-09 NOTE — H&P ADULT - NSHPPHYSICALEXAM_GEN_ALL_CORE
General: A&Ox3, cooperative, NAD   Pelvic: (performed by Dr. Morin) cervix 1cm, moderate amount of bright red blood in the vaginal vault

## 2017-05-09 NOTE — H&P ADULT - HISTORY OF PRESENT ILLNESS
37 y/o  who presents 3 months postpartum after  delivery with heavy vaginal bleeding. Patient has been followed by her primary physician Dr rahman. Patient did not respond to outpatient OCP. Today, she felt a gush of blood that saturated her pants.

## 2017-05-09 NOTE — ED PROVIDER NOTE - MEDICAL DECISION MAKING DETAILS
pt w/post c section bleeding x 3 mon, sent by gyn for D&C, not toxic, hemodynamically stable, pre op done, admitted as per gyn

## 2017-05-09 NOTE — ED ADULT NURSE NOTE - OBJECTIVE STATEMENT
received pt in [facility]. A&Ox3, presents to ed for c.o heavy bleeding. pt 3 months post partum. denies cp, no sob. no n/v/d. vss. iv placed, labs drawn. obgyn consult at bedside. report given to or RN. NPO since approx 10 am. belongings given to mother at bedside. awaiting transport.

## 2017-05-09 NOTE — ED PROVIDER NOTE - ATTENDING CONTRIBUTION TO CARE
35yo F hx of delivery via c section 3 months ago, has had persistent bleeding, filling 1 tampon every few hours, since. Seen by her GYN and sent in from clinic for admission for D&C as bleeding too heavy for too long.

## 2017-05-09 NOTE — ED PROVIDER NOTE - OBJECTIVE STATEMENT
The pt is a 37 y/o F, who was sent to ED by gyn for D&C -- s/p C section 3 mon ago, has been having vag bleeding since. Denies pain, fevers, chills, dysuria, n/v/d, any other c/o

## 2017-05-10 PROCEDURE — 80048 BASIC METABOLIC PNL TOTAL CA: CPT

## 2017-05-10 PROCEDURE — 86900 BLOOD TYPING SEROLOGIC ABO: CPT

## 2017-05-10 PROCEDURE — 85730 THROMBOPLASTIN TIME PARTIAL: CPT

## 2017-05-10 PROCEDURE — 58558 HYSTEROSCOPY BIOPSY: CPT

## 2017-05-10 PROCEDURE — 85610 PROTHROMBIN TIME: CPT

## 2017-05-10 PROCEDURE — 99285 EMERGENCY DEPT VISIT HI MDM: CPT | Mod: 25

## 2017-05-10 PROCEDURE — 85027 COMPLETE CBC AUTOMATED: CPT

## 2017-05-10 PROCEDURE — 86850 RBC ANTIBODY SCREEN: CPT

## 2017-05-10 PROCEDURE — 36415 COLL VENOUS BLD VENIPUNCTURE: CPT

## 2017-05-10 PROCEDURE — 86901 BLOOD TYPING SEROLOGIC RH(D): CPT

## 2017-05-10 PROCEDURE — 88305 TISSUE EXAM BY PATHOLOGIST: CPT

## 2017-05-11 LAB — SURGICAL PATHOLOGY STUDY: SIGNIFICANT CHANGE UP

## 2018-09-21 ENCOUNTER — RESULT REVIEW (OUTPATIENT)
Age: 38
End: 2018-09-21

## 2018-12-19 NOTE — DISCHARGE NOTE OB - REASON FOR ADMISSION
RX refilled per Dr. Salcido's protocol and eprescribed to preferred pharmacy.      
RX refilled per Dr. Salcido's protocol and eprescribed to preferred pharmacy.      
elective induction

## 2019-02-15 PROBLEM — Z00.00 ENCOUNTER FOR PREVENTIVE HEALTH EXAMINATION: Status: ACTIVE | Noted: 2019-02-15

## 2019-04-03 ENCOUNTER — OUTPATIENT (OUTPATIENT)
Dept: OUTPATIENT SERVICES | Facility: HOSPITAL | Age: 39
LOS: 1 days | End: 2019-04-03
Payer: COMMERCIAL

## 2019-04-03 DIAGNOSIS — Z3A.00 WEEKS OF GESTATION OF PREGNANCY NOT SPECIFIED: ICD-10-CM

## 2019-04-03 DIAGNOSIS — O26.899 OTHER SPECIFIED PREGNANCY RELATED CONDITIONS, UNSPECIFIED TRIMESTER: ICD-10-CM

## 2019-04-03 LAB
ALBUMIN SERPL ELPH-MCNC: 3.7 G/DL — SIGNIFICANT CHANGE UP (ref 3.3–5)
ALP SERPL-CCNC: 58 U/L — SIGNIFICANT CHANGE UP (ref 40–120)
ALT FLD-CCNC: 11 U/L — SIGNIFICANT CHANGE UP (ref 10–45)
AMYLASE P1 CFR SERPL: 73 U/L — SIGNIFICANT CHANGE UP (ref 25–125)
ANION GAP SERPL CALC-SCNC: 14 MMOL/L — SIGNIFICANT CHANGE UP (ref 5–17)
APPEARANCE UR: CLEAR — SIGNIFICANT CHANGE UP
APTT BLD: 28.2 SEC — SIGNIFICANT CHANGE UP (ref 27.5–36.3)
AST SERPL-CCNC: 16 U/L — SIGNIFICANT CHANGE UP (ref 10–40)
BASOPHILS # BLD AUTO: 0.02 K/UL — SIGNIFICANT CHANGE UP (ref 0–0.2)
BASOPHILS NFR BLD AUTO: 0.2 % — SIGNIFICANT CHANGE UP (ref 0–2)
BILIRUB SERPL-MCNC: 0.2 MG/DL — SIGNIFICANT CHANGE UP (ref 0.2–1.2)
BILIRUB UR-MCNC: NEGATIVE — SIGNIFICANT CHANGE UP
BUN SERPL-MCNC: 8 MG/DL — SIGNIFICANT CHANGE UP (ref 7–23)
CALCIUM SERPL-MCNC: 9.5 MG/DL — SIGNIFICANT CHANGE UP (ref 8.4–10.5)
CHLORIDE SERPL-SCNC: 102 MMOL/L — SIGNIFICANT CHANGE UP (ref 96–108)
CO2 SERPL-SCNC: 21 MMOL/L — LOW (ref 22–31)
COLOR SPEC: YELLOW — SIGNIFICANT CHANGE UP
CREAT SERPL-MCNC: 0.35 MG/DL — LOW (ref 0.5–1.3)
DIFF PNL FLD: NEGATIVE — SIGNIFICANT CHANGE UP
EOSINOPHIL # BLD AUTO: 0.16 K/UL — SIGNIFICANT CHANGE UP (ref 0–0.5)
EOSINOPHIL NFR BLD AUTO: 1.5 % — SIGNIFICANT CHANGE UP (ref 0–6)
FIBRINOGEN PPP-MCNC: 414 MG/DL — SIGNIFICANT CHANGE UP (ref 258–438)
GLUCOSE SERPL-MCNC: 93 MG/DL — SIGNIFICANT CHANGE UP (ref 70–99)
GLUCOSE UR QL: NEGATIVE — SIGNIFICANT CHANGE UP
HCT VFR BLD CALC: 34 % — LOW (ref 34.5–45)
HGB BLD-MCNC: 11.2 G/DL — LOW (ref 11.5–15.5)
IMM GRANULOCYTES NFR BLD AUTO: 1.1 % — SIGNIFICANT CHANGE UP (ref 0–1.5)
INR BLD: 1.03 — SIGNIFICANT CHANGE UP (ref 0.88–1.16)
KETONES UR-MCNC: NEGATIVE — SIGNIFICANT CHANGE UP
LDH SERPL L TO P-CCNC: 189 U/L — SIGNIFICANT CHANGE UP (ref 50–242)
LEUKOCYTE ESTERASE UR-ACNC: NEGATIVE — SIGNIFICANT CHANGE UP
LIDOCAIN IGE QN: 51 U/L — SIGNIFICANT CHANGE UP (ref 7–60)
LYMPHOCYTES # BLD AUTO: 2.22 K/UL — SIGNIFICANT CHANGE UP (ref 1–3.3)
LYMPHOCYTES # BLD AUTO: 20.7 % — SIGNIFICANT CHANGE UP (ref 13–44)
MCHC RBC-ENTMCNC: 30 PG — SIGNIFICANT CHANGE UP (ref 27–34)
MCHC RBC-ENTMCNC: 32.9 GM/DL — SIGNIFICANT CHANGE UP (ref 32–36)
MCV RBC AUTO: 91.2 FL — SIGNIFICANT CHANGE UP (ref 80–100)
MONOCYTES # BLD AUTO: 0.77 K/UL — SIGNIFICANT CHANGE UP (ref 0–0.9)
MONOCYTES NFR BLD AUTO: 7.2 % — SIGNIFICANT CHANGE UP (ref 2–14)
NEUTROPHILS # BLD AUTO: 7.45 K/UL — HIGH (ref 1.8–7.4)
NEUTROPHILS NFR BLD AUTO: 69.3 % — SIGNIFICANT CHANGE UP (ref 43–77)
NITRITE UR-MCNC: NEGATIVE — SIGNIFICANT CHANGE UP
NRBC # BLD: 0 /100 WBCS — SIGNIFICANT CHANGE UP (ref 0–0)
PH UR: 6.5 — SIGNIFICANT CHANGE UP (ref 5–8)
PLATELET # BLD AUTO: 230 K/UL — SIGNIFICANT CHANGE UP (ref 150–400)
POTASSIUM SERPL-MCNC: 3.4 MMOL/L — LOW (ref 3.5–5.3)
POTASSIUM SERPL-SCNC: 3.4 MMOL/L — LOW (ref 3.5–5.3)
PROT SERPL-MCNC: 6.8 G/DL — SIGNIFICANT CHANGE UP (ref 6–8.3)
PROT UR-MCNC: NEGATIVE MG/DL — SIGNIFICANT CHANGE UP
PROTHROM AB SERPL-ACNC: 11.6 SEC — SIGNIFICANT CHANGE UP (ref 10–12.9)
RBC # BLD: 3.73 M/UL — LOW (ref 3.8–5.2)
RBC # FLD: 14.8 % — HIGH (ref 10.3–14.5)
SODIUM SERPL-SCNC: 137 MMOL/L — SIGNIFICANT CHANGE UP (ref 135–145)
SP GR SPEC: 1.02 — SIGNIFICANT CHANGE UP (ref 1–1.03)
URATE SERPL-MCNC: 2.7 MG/DL — SIGNIFICANT CHANGE UP (ref 2.5–7)
UROBILINOGEN FLD QL: 0.2 E.U./DL — SIGNIFICANT CHANGE UP
WBC # BLD: 10.74 K/UL — HIGH (ref 3.8–10.5)
WBC # FLD AUTO: 10.74 K/UL — HIGH (ref 3.8–10.5)

## 2019-04-03 PROCEDURE — 85384 FIBRINOGEN ACTIVITY: CPT

## 2019-04-03 PROCEDURE — 80053 COMPREHEN METABOLIC PANEL: CPT

## 2019-04-03 PROCEDURE — 85730 THROMBOPLASTIN TIME PARTIAL: CPT

## 2019-04-03 PROCEDURE — 82150 ASSAY OF AMYLASE: CPT

## 2019-04-03 PROCEDURE — 36415 COLL VENOUS BLD VENIPUNCTURE: CPT

## 2019-04-03 PROCEDURE — 84550 ASSAY OF BLOOD/URIC ACID: CPT

## 2019-04-03 PROCEDURE — 76818 FETAL BIOPHYS PROFILE W/NST: CPT

## 2019-04-03 PROCEDURE — 83615 LACTATE (LD) (LDH) ENZYME: CPT

## 2019-04-03 PROCEDURE — 83690 ASSAY OF LIPASE: CPT

## 2019-04-03 PROCEDURE — 86780 TREPONEMA PALLIDUM: CPT

## 2019-04-03 PROCEDURE — 81003 URINALYSIS AUTO W/O SCOPE: CPT

## 2019-04-03 PROCEDURE — 85025 COMPLETE CBC W/AUTO DIFF WBC: CPT

## 2019-04-03 PROCEDURE — 99214 OFFICE O/P EST MOD 30 MIN: CPT

## 2019-04-03 PROCEDURE — 96360 HYDRATION IV INFUSION INIT: CPT

## 2019-04-03 PROCEDURE — 85610 PROTHROMBIN TIME: CPT

## 2019-04-03 RX ORDER — FAMOTIDINE 10 MG/ML
20 INJECTION INTRAVENOUS ONCE
Qty: 0 | Refills: 0 | Status: DISCONTINUED | OUTPATIENT
Start: 2019-04-03 | End: 2019-04-18

## 2019-04-03 RX ORDER — FAMOTIDINE 10 MG/ML
20 INJECTION INTRAVENOUS ONCE
Qty: 0 | Refills: 0 | Status: DISCONTINUED | OUTPATIENT
Start: 2019-04-03 | End: 2019-04-03

## 2019-04-03 RX ORDER — SODIUM CHLORIDE 9 MG/ML
1000 INJECTION, SOLUTION INTRAVENOUS ONCE
Qty: 0 | Refills: 0 | Status: COMPLETED | OUTPATIENT
Start: 2019-04-03 | End: 2019-04-03

## 2019-04-03 RX ADMIN — SODIUM CHLORIDE 1000 MILLILITER(S): 9 INJECTION, SOLUTION INTRAVENOUS at 15:00

## 2019-04-03 RX ADMIN — Medication 1 CAPSULE(S): at 15:31

## 2019-04-04 LAB — T PALLIDUM AB TITR SER: NEGATIVE — SIGNIFICANT CHANGE UP

## 2019-05-28 ENCOUNTER — APPOINTMENT (OUTPATIENT)
Dept: ANTEPARTUM | Facility: CLINIC | Age: 39
End: 2019-05-28
Payer: COMMERCIAL

## 2019-05-28 PROCEDURE — 76816 OB US FOLLOW-UP PER FETUS: CPT

## 2019-05-28 PROCEDURE — 76820 UMBILICAL ARTERY ECHO: CPT

## 2019-05-28 PROCEDURE — 76819 FETAL BIOPHYS PROFIL W/O NST: CPT

## 2019-06-12 ENCOUNTER — OUTPATIENT (OUTPATIENT)
Dept: OUTPATIENT SERVICES | Facility: HOSPITAL | Age: 39
LOS: 1 days | End: 2019-06-12
Payer: COMMERCIAL

## 2019-06-12 DIAGNOSIS — Z3A.00 WEEKS OF GESTATION OF PREGNANCY NOT SPECIFIED: ICD-10-CM

## 2019-06-12 DIAGNOSIS — O26.899 OTHER SPECIFIED PREGNANCY RELATED CONDITIONS, UNSPECIFIED TRIMESTER: ICD-10-CM

## 2019-06-12 LAB
ALBUMIN SERPL ELPH-MCNC: 3.7 G/DL — SIGNIFICANT CHANGE UP (ref 3.3–5)
ALP SERPL-CCNC: 90 U/L — SIGNIFICANT CHANGE UP (ref 40–120)
ALT FLD-CCNC: 11 U/L — SIGNIFICANT CHANGE UP (ref 10–45)
ANION GAP SERPL CALC-SCNC: 12 MMOL/L — SIGNIFICANT CHANGE UP (ref 5–17)
AST SERPL-CCNC: 15 U/L — SIGNIFICANT CHANGE UP (ref 10–40)
BASOPHILS # BLD AUTO: 0.03 K/UL — SIGNIFICANT CHANGE UP (ref 0–0.2)
BASOPHILS NFR BLD AUTO: 0.3 % — SIGNIFICANT CHANGE UP (ref 0–2)
BILIRUB SERPL-MCNC: 0.2 MG/DL — SIGNIFICANT CHANGE UP (ref 0.2–1.2)
BUN SERPL-MCNC: 8 MG/DL — SIGNIFICANT CHANGE UP (ref 7–23)
CALCIUM SERPL-MCNC: 9.4 MG/DL — SIGNIFICANT CHANGE UP (ref 8.4–10.5)
CHLORIDE SERPL-SCNC: 103 MMOL/L — SIGNIFICANT CHANGE UP (ref 96–108)
CO2 SERPL-SCNC: 22 MMOL/L — SIGNIFICANT CHANGE UP (ref 22–31)
CREAT SERPL-MCNC: 0.35 MG/DL — LOW (ref 0.5–1.3)
EOSINOPHIL # BLD AUTO: 0.13 K/UL — SIGNIFICANT CHANGE UP (ref 0–0.5)
EOSINOPHIL NFR BLD AUTO: 1.2 % — SIGNIFICANT CHANGE UP (ref 0–6)
GLUCOSE SERPL-MCNC: 89 MG/DL — SIGNIFICANT CHANGE UP (ref 70–99)
HCT VFR BLD CALC: 32.4 % — LOW (ref 34.5–45)
HGB BLD-MCNC: 10.6 G/DL — LOW (ref 11.5–15.5)
IMM GRANULOCYTES NFR BLD AUTO: 1.8 % — HIGH (ref 0–1.5)
LYMPHOCYTES # BLD AUTO: 1.88 K/UL — SIGNIFICANT CHANGE UP (ref 1–3.3)
LYMPHOCYTES # BLD AUTO: 17.6 % — SIGNIFICANT CHANGE UP (ref 13–44)
MCHC RBC-ENTMCNC: 30.2 PG — SIGNIFICANT CHANGE UP (ref 27–34)
MCHC RBC-ENTMCNC: 32.7 GM/DL — SIGNIFICANT CHANGE UP (ref 32–36)
MCV RBC AUTO: 92.3 FL — SIGNIFICANT CHANGE UP (ref 80–100)
MONOCYTES # BLD AUTO: 0.83 K/UL — SIGNIFICANT CHANGE UP (ref 0–0.9)
MONOCYTES NFR BLD AUTO: 7.8 % — SIGNIFICANT CHANGE UP (ref 2–14)
NEUTROPHILS # BLD AUTO: 7.63 K/UL — HIGH (ref 1.8–7.4)
NEUTROPHILS NFR BLD AUTO: 71.3 % — SIGNIFICANT CHANGE UP (ref 43–77)
NRBC # BLD: 0 /100 WBCS — SIGNIFICANT CHANGE UP (ref 0–0)
PLATELET # BLD AUTO: 205 K/UL — SIGNIFICANT CHANGE UP (ref 150–400)
POTASSIUM SERPL-MCNC: 4 MMOL/L — SIGNIFICANT CHANGE UP (ref 3.5–5.3)
POTASSIUM SERPL-SCNC: 4 MMOL/L — SIGNIFICANT CHANGE UP (ref 3.5–5.3)
PROT SERPL-MCNC: 6.9 G/DL — SIGNIFICANT CHANGE UP (ref 6–8.3)
RBC # BLD: 3.51 M/UL — LOW (ref 3.8–5.2)
RBC # FLD: 14.8 % — HIGH (ref 10.3–14.5)
SODIUM SERPL-SCNC: 137 MMOL/L — SIGNIFICANT CHANGE UP (ref 135–145)
WBC # BLD: 10.69 K/UL — HIGH (ref 3.8–10.5)
WBC # FLD AUTO: 10.69 K/UL — HIGH (ref 3.8–10.5)

## 2019-06-12 PROCEDURE — 85025 COMPLETE CBC W/AUTO DIFF WBC: CPT

## 2019-06-12 PROCEDURE — 96361 HYDRATE IV INFUSION ADD-ON: CPT

## 2019-06-12 PROCEDURE — 36415 COLL VENOUS BLD VENIPUNCTURE: CPT

## 2019-06-12 PROCEDURE — 80053 COMPREHEN METABOLIC PANEL: CPT

## 2019-06-12 PROCEDURE — 99214 OFFICE O/P EST MOD 30 MIN: CPT

## 2019-06-12 PROCEDURE — 76818 FETAL BIOPHYS PROFILE W/NST: CPT

## 2019-06-12 RX ORDER — SIMETHICONE 80 MG/1
80 TABLET, CHEWABLE ORAL ONCE
Refills: 0 | Status: DISCONTINUED | OUTPATIENT
Start: 2019-06-12 | End: 2019-06-27

## 2019-06-12 RX ORDER — SODIUM CHLORIDE 9 MG/ML
1000 INJECTION, SOLUTION INTRAVENOUS ONCE
Refills: 0 | Status: COMPLETED | OUTPATIENT
Start: 2019-06-12 | End: 2019-06-12

## 2019-06-12 RX ORDER — SODIUM CHLORIDE 9 MG/ML
1000 INJECTION, SOLUTION INTRAVENOUS
Refills: 0 | Status: DISCONTINUED | OUTPATIENT
Start: 2019-06-12 | End: 2019-06-27

## 2019-06-12 RX ORDER — SODIUM CHLORIDE 9 MG/ML
1000 INJECTION, SOLUTION INTRAVENOUS ONCE
Refills: 0 | Status: DISCONTINUED | OUTPATIENT
Start: 2019-06-12 | End: 2019-06-12

## 2019-06-12 RX ADMIN — SODIUM CHLORIDE 1000 MILLILITER(S): 9 INJECTION, SOLUTION INTRAVENOUS at 10:52

## 2019-06-17 ENCOUNTER — APPOINTMENT (OUTPATIENT)
Dept: ANTEPARTUM | Facility: CLINIC | Age: 39
End: 2019-06-17
Payer: COMMERCIAL

## 2019-06-17 PROCEDURE — 76819 FETAL BIOPHYS PROFIL W/O NST: CPT

## 2019-06-17 PROCEDURE — 76816 OB US FOLLOW-UP PER FETUS: CPT

## 2019-06-27 ENCOUNTER — RESULT REVIEW (OUTPATIENT)
Age: 39
End: 2019-06-27

## 2019-06-27 ENCOUNTER — INPATIENT (INPATIENT)
Facility: HOSPITAL | Age: 39
LOS: 4 days | Discharge: HOME | End: 2019-07-02
Attending: OBSTETRICS & GYNECOLOGY | Admitting: OBSTETRICS & GYNECOLOGY
Payer: COMMERCIAL

## 2019-06-27 VITALS — WEIGHT: 218.26 LBS | HEIGHT: 63 IN

## 2019-06-27 DIAGNOSIS — O26.899 OTHER SPECIFIED PREGNANCY RELATED CONDITIONS, UNSPECIFIED TRIMESTER: ICD-10-CM

## 2019-06-27 DIAGNOSIS — Z3A.00 WEEKS OF GESTATION OF PREGNANCY NOT SPECIFIED: ICD-10-CM

## 2019-06-27 LAB
ALBUMIN SERPL ELPH-MCNC: 3.8 G/DL — SIGNIFICANT CHANGE UP (ref 3.3–5)
ALP SERPL-CCNC: 85 U/L — SIGNIFICANT CHANGE UP (ref 40–120)
ALT FLD-CCNC: 11 U/L — SIGNIFICANT CHANGE UP (ref 10–45)
ANION GAP SERPL CALC-SCNC: 14 MMOL/L — SIGNIFICANT CHANGE UP (ref 5–17)
APPEARANCE UR: ABNORMAL
AST SERPL-CCNC: 17 U/L — SIGNIFICANT CHANGE UP (ref 10–40)
BASOPHILS # BLD AUTO: 0 K/UL — SIGNIFICANT CHANGE UP (ref 0–0.2)
BASOPHILS NFR BLD AUTO: 0 % — SIGNIFICANT CHANGE UP (ref 0–2)
BILIRUB SERPL-MCNC: 0.2 MG/DL — SIGNIFICANT CHANGE UP (ref 0.2–1.2)
BILIRUB UR-MCNC: NEGATIVE — SIGNIFICANT CHANGE UP
BLD GP AB SCN SERPL QL: NEGATIVE — SIGNIFICANT CHANGE UP
BUN SERPL-MCNC: 10 MG/DL — SIGNIFICANT CHANGE UP (ref 7–23)
CALCIUM SERPL-MCNC: 9 MG/DL — SIGNIFICANT CHANGE UP (ref 8.4–10.5)
CHLORIDE SERPL-SCNC: 104 MMOL/L — SIGNIFICANT CHANGE UP (ref 96–108)
CO2 SERPL-SCNC: 20 MMOL/L — LOW (ref 22–31)
COLOR SPEC: YELLOW — SIGNIFICANT CHANGE UP
CREAT ?TM UR-MCNC: 273 MG/DL — SIGNIFICANT CHANGE UP
CREAT SERPL-MCNC: 0.42 MG/DL — LOW (ref 0.5–1.3)
DIFF PNL FLD: NEGATIVE — SIGNIFICANT CHANGE UP
EOSINOPHIL # BLD AUTO: 0 K/UL — SIGNIFICANT CHANGE UP (ref 0–0.5)
EOSINOPHIL NFR BLD AUTO: 0 % — SIGNIFICANT CHANGE UP (ref 0–6)
GLUCOSE SERPL-MCNC: 115 MG/DL — HIGH (ref 70–99)
GLUCOSE UR QL: NEGATIVE — SIGNIFICANT CHANGE UP
HCT VFR BLD CALC: 35.3 % — SIGNIFICANT CHANGE UP (ref 34.5–45)
HGB BLD-MCNC: 11.5 G/DL — SIGNIFICANT CHANGE UP (ref 11.5–15.5)
KETONES UR-MCNC: NEGATIVE — SIGNIFICANT CHANGE UP
LDH SERPL L TO P-CCNC: 226 U/L — SIGNIFICANT CHANGE UP (ref 50–242)
LEUKOCYTE ESTERASE UR-ACNC: NEGATIVE — SIGNIFICANT CHANGE UP
LYMPHOCYTES # BLD AUTO: 18.3 % — SIGNIFICANT CHANGE UP (ref 13–44)
LYMPHOCYTES # BLD AUTO: 2.17 K/UL — SIGNIFICANT CHANGE UP (ref 1–3.3)
MCHC RBC-ENTMCNC: 30.2 PG — SIGNIFICANT CHANGE UP (ref 27–34)
MCHC RBC-ENTMCNC: 32.6 GM/DL — SIGNIFICANT CHANGE UP (ref 32–36)
MCV RBC AUTO: 92.7 FL — SIGNIFICANT CHANGE UP (ref 80–100)
MONOCYTES # BLD AUTO: 0.44 K/UL — SIGNIFICANT CHANGE UP (ref 0–0.9)
MONOCYTES NFR BLD AUTO: 3.7 % — SIGNIFICANT CHANGE UP (ref 2–14)
NEUTROPHILS # BLD AUTO: 9.13 K/UL — HIGH (ref 1.8–7.4)
NEUTROPHILS NFR BLD AUTO: 74.3 % — SIGNIFICANT CHANGE UP (ref 43–77)
NITRITE UR-MCNC: NEGATIVE — SIGNIFICANT CHANGE UP
PH UR: 6 — SIGNIFICANT CHANGE UP (ref 5–8)
PLATELET # BLD AUTO: 227 K/UL — SIGNIFICANT CHANGE UP (ref 150–400)
POTASSIUM SERPL-MCNC: 3.9 MMOL/L — SIGNIFICANT CHANGE UP (ref 3.5–5.3)
POTASSIUM SERPL-SCNC: 3.9 MMOL/L — SIGNIFICANT CHANGE UP (ref 3.5–5.3)
PROT ?TM UR-MCNC: 33 MG/DL — HIGH (ref 0–12)
PROT SERPL-MCNC: 7 G/DL — SIGNIFICANT CHANGE UP (ref 6–8.3)
PROT UR-MCNC: ABNORMAL MG/DL
PROT/CREAT UR-RTO: 0.1 RATIO — SIGNIFICANT CHANGE UP (ref 0–0.2)
RBC # BLD: 3.81 M/UL — SIGNIFICANT CHANGE UP (ref 3.8–5.2)
RBC # FLD: 14.6 % — HIGH (ref 10.3–14.5)
RH IG SCN BLD-IMP: POSITIVE — SIGNIFICANT CHANGE UP
SODIUM SERPL-SCNC: 138 MMOL/L — SIGNIFICANT CHANGE UP (ref 135–145)
SP GR SPEC: >=1.03 — SIGNIFICANT CHANGE UP (ref 1–1.03)
URATE SERPL-MCNC: 3.4 MG/DL — SIGNIFICANT CHANGE UP (ref 2.5–7)
UROBILINOGEN FLD QL: 0.2 E.U./DL — SIGNIFICANT CHANGE UP
WBC # BLD: 11.84 K/UL — HIGH (ref 3.8–10.5)
WBC # FLD AUTO: 11.84 K/UL — HIGH (ref 3.8–10.5)

## 2019-06-27 RX ORDER — MAGNESIUM SULFATE 500 MG/ML
4 VIAL (ML) INJECTION ONCE
Refills: 0 | Status: COMPLETED | OUTPATIENT
Start: 2019-06-27 | End: 2019-06-27

## 2019-06-27 RX ORDER — CHLORHEXIDINE GLUCONATE 213 G/1000ML
1 SOLUTION TOPICAL DAILY
Refills: 0 | Status: DISCONTINUED | OUTPATIENT
Start: 2019-06-27 | End: 2019-07-02

## 2019-06-27 RX ORDER — CITRIC ACID/SODIUM CITRATE 300-500 MG
30 SOLUTION, ORAL ORAL ONCE
Refills: 0 | Status: COMPLETED | OUTPATIENT
Start: 2019-06-27 | End: 2019-06-28

## 2019-06-27 RX ORDER — CEFAZOLIN SODIUM 1 G
2000 VIAL (EA) INJECTION ONCE
Refills: 0 | Status: COMPLETED | OUTPATIENT
Start: 2019-06-27 | End: 2019-06-27

## 2019-06-27 RX ORDER — SODIUM CHLORIDE 9 MG/ML
1000 INJECTION, SOLUTION INTRAVENOUS
Refills: 0 | Status: DISCONTINUED | OUTPATIENT
Start: 2019-06-27 | End: 2019-06-28

## 2019-06-27 RX ORDER — SODIUM CHLORIDE 9 MG/ML
1000 INJECTION, SOLUTION INTRAVENOUS ONCE
Refills: 0 | Status: COMPLETED | OUTPATIENT
Start: 2019-06-27 | End: 2019-06-27

## 2019-06-27 RX ORDER — MAGNESIUM SULFATE 500 MG/ML
2 VIAL (ML) INJECTION
Qty: 40 | Refills: 0 | Status: DISCONTINUED | OUTPATIENT
Start: 2019-06-27 | End: 2019-06-28

## 2019-06-27 RX ORDER — DIPHENHYDRAMINE HCL 50 MG
25 CAPSULE ORAL ONCE
Refills: 0 | Status: DISCONTINUED | OUTPATIENT
Start: 2019-06-27 | End: 2019-06-27

## 2019-06-27 RX ORDER — METOCLOPRAMIDE HCL 10 MG
10 TABLET ORAL ONCE
Refills: 0 | Status: DISCONTINUED | OUTPATIENT
Start: 2019-06-27 | End: 2019-07-02

## 2019-06-27 RX ORDER — OXYTOCIN 10 UNIT/ML
333.33 VIAL (ML) INJECTION
Qty: 20 | Refills: 0 | Status: DISCONTINUED | OUTPATIENT
Start: 2019-06-27 | End: 2019-07-02

## 2019-06-27 RX ORDER — DIPHENHYDRAMINE HCL 50 MG
25 CAPSULE ORAL ONCE
Refills: 0 | Status: COMPLETED | OUTPATIENT
Start: 2019-06-27 | End: 2019-06-27

## 2019-06-27 RX ADMIN — Medication 100 GRAM(S): at 21:40

## 2019-06-27 RX ADMIN — Medication 50 GM/HR: at 22:00

## 2019-06-27 RX ADMIN — SODIUM CHLORIDE 2000 MILLILITER(S): 9 INJECTION, SOLUTION INTRAVENOUS at 19:40

## 2019-06-27 RX ADMIN — SODIUM CHLORIDE 125 MILLILITER(S): 9 INJECTION, SOLUTION INTRAVENOUS at 19:01

## 2019-06-27 RX ADMIN — Medication 25 MILLIGRAM(S): at 22:00

## 2019-06-27 RX ADMIN — Medication 30 MILLILITER(S): at 21:00

## 2019-06-28 LAB
ALBUMIN SERPL ELPH-MCNC: 3 G/DL — LOW (ref 3.3–5)
ALP SERPL-CCNC: 79 U/L — SIGNIFICANT CHANGE UP (ref 40–120)
ALT FLD-CCNC: 8 U/L — LOW (ref 10–45)
ANION GAP SERPL CALC-SCNC: 11 MMOL/L — SIGNIFICANT CHANGE UP (ref 5–17)
APTT BLD: 26.9 SEC — LOW (ref 27.5–36.3)
AST SERPL-CCNC: 17 U/L — SIGNIFICANT CHANGE UP (ref 10–40)
BASOPHILS # BLD AUTO: 0.03 K/UL — SIGNIFICANT CHANGE UP (ref 0–0.2)
BASOPHILS NFR BLD AUTO: 0.2 % — SIGNIFICANT CHANGE UP (ref 0–2)
BILIRUB SERPL-MCNC: 0.2 MG/DL — SIGNIFICANT CHANGE UP (ref 0.2–1.2)
BUN SERPL-MCNC: 4 MG/DL — LOW (ref 7–23)
CALCIUM SERPL-MCNC: 6.7 MG/DL — LOW (ref 8.4–10.5)
CHLORIDE SERPL-SCNC: 103 MMOL/L — SIGNIFICANT CHANGE UP (ref 96–108)
CO2 SERPL-SCNC: 22 MMOL/L — SIGNIFICANT CHANGE UP (ref 22–31)
CREAT SERPL-MCNC: 0.4 MG/DL — LOW (ref 0.5–1.3)
EOSINOPHIL # BLD AUTO: 0.03 K/UL — SIGNIFICANT CHANGE UP (ref 0–0.5)
EOSINOPHIL NFR BLD AUTO: 0.2 % — SIGNIFICANT CHANGE UP (ref 0–6)
FIBRINOGEN PPP-MCNC: 398 MG/DL — SIGNIFICANT CHANGE UP (ref 258–438)
GLUCOSE SERPL-MCNC: 100 MG/DL — HIGH (ref 70–99)
HCT VFR BLD CALC: 31.3 % — LOW (ref 34.5–45)
HGB BLD-MCNC: 10.2 G/DL — LOW (ref 11.5–15.5)
IMM GRANULOCYTES NFR BLD AUTO: 1.1 % — SIGNIFICANT CHANGE UP (ref 0–1.5)
INR BLD: 0.98 — SIGNIFICANT CHANGE UP (ref 0.88–1.16)
LDH SERPL L TO P-CCNC: 215 U/L — SIGNIFICANT CHANGE UP (ref 50–242)
LYMPHOCYTES # BLD AUTO: 1.49 K/UL — SIGNIFICANT CHANGE UP (ref 1–3.3)
LYMPHOCYTES # BLD AUTO: 11.5 % — LOW (ref 13–44)
MAGNESIUM SERPL-MCNC: 3.8 MG/DL — HIGH (ref 1.6–2.6)
MAGNESIUM SERPL-MCNC: 5.3 MG/DL — HIGH (ref 1.6–2.6)
MAGNESIUM SERPL-MCNC: 6.1 MG/DL — HIGH (ref 1.6–2.6)
MCHC RBC-ENTMCNC: 30.1 PG — SIGNIFICANT CHANGE UP (ref 27–34)
MCHC RBC-ENTMCNC: 32.6 GM/DL — SIGNIFICANT CHANGE UP (ref 32–36)
MCV RBC AUTO: 92.3 FL — SIGNIFICANT CHANGE UP (ref 80–100)
MONOCYTES # BLD AUTO: 0.97 K/UL — HIGH (ref 0–0.9)
MONOCYTES NFR BLD AUTO: 7.5 % — SIGNIFICANT CHANGE UP (ref 2–14)
NEUTROPHILS # BLD AUTO: 10.26 K/UL — HIGH (ref 1.8–7.4)
NEUTROPHILS NFR BLD AUTO: 79.5 % — HIGH (ref 43–77)
NRBC # BLD: 0 /100 WBCS — SIGNIFICANT CHANGE UP (ref 0–0)
PLATELET # BLD AUTO: 193 K/UL — SIGNIFICANT CHANGE UP (ref 150–400)
POTASSIUM SERPL-MCNC: 3.6 MMOL/L — SIGNIFICANT CHANGE UP (ref 3.5–5.3)
POTASSIUM SERPL-SCNC: 3.6 MMOL/L — SIGNIFICANT CHANGE UP (ref 3.5–5.3)
PROT SERPL-MCNC: 5.8 G/DL — LOW (ref 6–8.3)
PROTHROM AB SERPL-ACNC: 11.1 SEC — SIGNIFICANT CHANGE UP (ref 10–12.9)
RBC # BLD: 3.39 M/UL — LOW (ref 3.8–5.2)
RBC # FLD: 14.5 % — SIGNIFICANT CHANGE UP (ref 10.3–14.5)
SODIUM SERPL-SCNC: 136 MMOL/L — SIGNIFICANT CHANGE UP (ref 135–145)
T PALLIDUM AB TITR SER: NEGATIVE — SIGNIFICANT CHANGE UP
URATE SERPL-MCNC: 3.5 MG/DL — SIGNIFICANT CHANGE UP (ref 2.5–7)
WBC # BLD: 12.92 K/UL — HIGH (ref 3.8–10.5)
WBC # FLD AUTO: 12.92 K/UL — HIGH (ref 3.8–10.5)

## 2019-06-28 RX ORDER — TETANUS TOXOID, REDUCED DIPHTHERIA TOXOID AND ACELLULAR PERTUSSIS VACCINE, ADSORBED 5; 2.5; 8; 8; 2.5 [IU]/.5ML; [IU]/.5ML; UG/.5ML; UG/.5ML; UG/.5ML
0.5 SUSPENSION INTRAMUSCULAR ONCE
Refills: 0 | Status: COMPLETED | OUTPATIENT
Start: 2019-06-28

## 2019-06-28 RX ORDER — OXYCODONE HYDROCHLORIDE 5 MG/1
5 TABLET ORAL ONCE
Refills: 0 | Status: DISCONTINUED | OUTPATIENT
Start: 2019-06-28 | End: 2019-06-29

## 2019-06-28 RX ORDER — MAGNESIUM HYDROXIDE 400 MG/1
30 TABLET, CHEWABLE ORAL
Refills: 0 | Status: DISCONTINUED | OUTPATIENT
Start: 2019-06-28 | End: 2019-07-02

## 2019-06-28 RX ORDER — KETOROLAC TROMETHAMINE 30 MG/ML
30 SYRINGE (ML) INJECTION EVERY 6 HOURS
Refills: 0 | Status: DISCONTINUED | OUTPATIENT
Start: 2019-06-28 | End: 2019-06-29

## 2019-06-28 RX ORDER — NALOXONE HYDROCHLORIDE 4 MG/.1ML
0.1 SPRAY NASAL
Refills: 0 | Status: DISCONTINUED | OUTPATIENT
Start: 2019-06-28 | End: 2019-07-02

## 2019-06-28 RX ORDER — MAGNESIUM SULFATE 500 MG/ML
2 VIAL (ML) INJECTION
Qty: 40 | Refills: 0 | Status: DISCONTINUED | OUTPATIENT
Start: 2019-06-28 | End: 2019-06-29

## 2019-06-28 RX ORDER — DOCUSATE SODIUM 100 MG
100 CAPSULE ORAL
Refills: 0 | Status: DISCONTINUED | OUTPATIENT
Start: 2019-06-28 | End: 2019-07-02

## 2019-06-28 RX ORDER — IBUPROFEN 200 MG
600 TABLET ORAL EVERY 6 HOURS
Refills: 0 | Status: COMPLETED | OUTPATIENT
Start: 2019-06-28 | End: 2020-05-26

## 2019-06-28 RX ORDER — DEXAMETHASONE 0.5 MG/5ML
4 ELIXIR ORAL EVERY 6 HOURS
Refills: 0 | Status: DISCONTINUED | OUTPATIENT
Start: 2019-06-28 | End: 2019-07-02

## 2019-06-28 RX ORDER — HYDROMORPHONE HYDROCHLORIDE 2 MG/ML
0.5 INJECTION INTRAMUSCULAR; INTRAVENOUS; SUBCUTANEOUS ONCE
Refills: 0 | Status: DISCONTINUED | OUTPATIENT
Start: 2019-06-28 | End: 2019-06-28

## 2019-06-28 RX ORDER — HEPARIN SODIUM 5000 [USP'U]/ML
5000 INJECTION INTRAVENOUS; SUBCUTANEOUS EVERY 12 HOURS
Refills: 0 | Status: DISCONTINUED | OUTPATIENT
Start: 2019-06-28 | End: 2019-07-02

## 2019-06-28 RX ORDER — OXYMETAZOLINE HYDROCHLORIDE 0.5 MG/ML
1 SPRAY NASAL ONCE
Refills: 0 | Status: COMPLETED | OUTPATIENT
Start: 2019-06-28 | End: 2019-06-28

## 2019-06-28 RX ORDER — SODIUM CHLORIDE 9 MG/ML
1000 INJECTION, SOLUTION INTRAVENOUS
Refills: 0 | Status: DISCONTINUED | OUTPATIENT
Start: 2019-06-28 | End: 2019-06-28

## 2019-06-28 RX ORDER — ACETAMINOPHEN 500 MG
975 TABLET ORAL
Refills: 0 | Status: DISCONTINUED | OUTPATIENT
Start: 2019-06-28 | End: 2019-07-02

## 2019-06-28 RX ORDER — ONDANSETRON 8 MG/1
4 TABLET, FILM COATED ORAL EVERY 6 HOURS
Refills: 0 | Status: DISCONTINUED | OUTPATIENT
Start: 2019-06-28 | End: 2019-07-02

## 2019-06-28 RX ORDER — SODIUM CHLORIDE 9 MG/ML
1000 INJECTION, SOLUTION INTRAVENOUS
Refills: 0 | Status: DISCONTINUED | OUTPATIENT
Start: 2019-06-28 | End: 2019-07-02

## 2019-06-28 RX ORDER — ZOLPIDEM TARTRATE 10 MG/1
5 TABLET ORAL ONCE
Refills: 0 | Status: DISCONTINUED | OUTPATIENT
Start: 2019-06-28 | End: 2019-06-28

## 2019-06-28 RX ORDER — GLYCERIN ADULT
1 SUPPOSITORY, RECTAL RECTAL AT BEDTIME
Refills: 0 | Status: DISCONTINUED | OUTPATIENT
Start: 2019-06-28 | End: 2019-07-02

## 2019-06-28 RX ORDER — DIPHENHYDRAMINE HCL 50 MG
25 CAPSULE ORAL EVERY 6 HOURS
Refills: 0 | Status: DISCONTINUED | OUTPATIENT
Start: 2019-06-28 | End: 2019-07-02

## 2019-06-28 RX ORDER — OXYCODONE HYDROCHLORIDE 5 MG/1
5 TABLET ORAL
Refills: 0 | Status: COMPLETED | OUTPATIENT
Start: 2019-06-28 | End: 2019-07-05

## 2019-06-28 RX ORDER — SIMETHICONE 80 MG/1
80 TABLET, CHEWABLE ORAL EVERY 4 HOURS
Refills: 0 | Status: DISCONTINUED | OUTPATIENT
Start: 2019-06-28 | End: 2019-07-02

## 2019-06-28 RX ORDER — FAMOTIDINE 10 MG/ML
20 INJECTION INTRAVENOUS ONCE
Refills: 0 | Status: COMPLETED | OUTPATIENT
Start: 2019-06-28 | End: 2019-06-28

## 2019-06-28 RX ORDER — MAGNESIUM SULFATE 500 MG/ML
3 VIAL (ML) INJECTION
Qty: 40 | Refills: 0 | Status: DISCONTINUED | OUTPATIENT
Start: 2019-06-28 | End: 2019-06-28

## 2019-06-28 RX ORDER — MORPHINE SULFATE 50 MG/1
0.2 CAPSULE, EXTENDED RELEASE ORAL ONCE
Refills: 0 | Status: DISCONTINUED | OUTPATIENT
Start: 2019-06-28 | End: 2019-07-02

## 2019-06-28 RX ORDER — OXYTOCIN 10 UNIT/ML
333.33 VIAL (ML) INJECTION
Qty: 20 | Refills: 0 | Status: DISCONTINUED | OUTPATIENT
Start: 2019-06-28 | End: 2019-07-02

## 2019-06-28 RX ORDER — LANOLIN
1 OINTMENT (GRAM) TOPICAL EVERY 6 HOURS
Refills: 0 | Status: DISCONTINUED | OUTPATIENT
Start: 2019-06-28 | End: 2019-07-02

## 2019-06-28 RX ORDER — DIPHENHYDRAMINE HCL 50 MG
25 CAPSULE ORAL EVERY 4 HOURS
Refills: 0 | Status: DISCONTINUED | OUTPATIENT
Start: 2019-06-28 | End: 2019-07-02

## 2019-06-28 RX ORDER — OXYCODONE HYDROCHLORIDE 5 MG/1
5 TABLET ORAL
Refills: 0 | Status: DISCONTINUED | OUTPATIENT
Start: 2019-06-28 | End: 2019-07-02

## 2019-06-28 RX ADMIN — HEPARIN SODIUM 5000 UNIT(S): 5000 INJECTION INTRAVENOUS; SUBCUTANEOUS at 18:50

## 2019-06-28 RX ADMIN — Medication 30 MILLILITER(S): at 04:15

## 2019-06-28 RX ADMIN — ZOLPIDEM TARTRATE 5 MILLIGRAM(S): 10 TABLET ORAL at 23:56

## 2019-06-28 RX ADMIN — Medication 975 MILLIGRAM(S): at 10:44

## 2019-06-28 RX ADMIN — SODIUM CHLORIDE 125 MILLILITER(S): 9 INJECTION, SOLUTION INTRAVENOUS at 00:00

## 2019-06-28 RX ADMIN — Medication 30 MILLIGRAM(S): at 14:08

## 2019-06-28 RX ADMIN — Medication 975 MILLIGRAM(S): at 18:53

## 2019-06-28 RX ADMIN — Medication 75 GM/HR: at 07:00

## 2019-06-28 RX ADMIN — Medication 75 GM/HR: at 14:09

## 2019-06-28 RX ADMIN — Medication 975 MILLIGRAM(S): at 13:03

## 2019-06-28 RX ADMIN — Medication 30 MILLIGRAM(S): at 13:03

## 2019-06-28 RX ADMIN — OXYCODONE HYDROCHLORIDE 5 MILLIGRAM(S): 5 TABLET ORAL at 18:22

## 2019-06-28 RX ADMIN — HYDROMORPHONE HYDROCHLORIDE 0.5 MILLIGRAM(S): 2 INJECTION INTRAMUSCULAR; INTRAVENOUS; SUBCUTANEOUS at 09:30

## 2019-06-28 RX ADMIN — Medication 30 MILLIGRAM(S): at 18:50

## 2019-06-28 RX ADMIN — Medication 30 MILLIGRAM(S): at 07:15

## 2019-06-28 RX ADMIN — CHLORHEXIDINE GLUCONATE 1 APPLICATION(S): 213 SOLUTION TOPICAL at 03:45

## 2019-06-28 RX ADMIN — FAMOTIDINE 20 MILLIGRAM(S): 10 INJECTION INTRAVENOUS at 02:40

## 2019-06-28 RX ADMIN — HYDROMORPHONE HYDROCHLORIDE 0.5 MILLIGRAM(S): 2 INJECTION INTRAMUSCULAR; INTRAVENOUS; SUBCUTANEOUS at 10:30

## 2019-06-28 RX ADMIN — OXYMETAZOLINE HYDROCHLORIDE 1 SPRAY(S): 0.5 SPRAY NASAL at 02:28

## 2019-06-28 RX ADMIN — OXYCODONE HYDROCHLORIDE 5 MILLIGRAM(S): 5 TABLET ORAL at 22:05

## 2019-06-28 RX ADMIN — OXYCODONE HYDROCHLORIDE 5 MILLIGRAM(S): 5 TABLET ORAL at 16:14

## 2019-06-28 RX ADMIN — Medication 30 MILLIGRAM(S): at 08:21

## 2019-06-28 RX ADMIN — OXYCODONE HYDROCHLORIDE 5 MILLIGRAM(S): 5 TABLET ORAL at 21:42

## 2019-06-28 RX ADMIN — Medication 30 MILLILITER(S): at 13:02

## 2019-06-28 RX ADMIN — Medication 100 MILLIGRAM(S): at 04:15

## 2019-06-28 RX ADMIN — Medication 25 MILLIGRAM(S): at 07:15

## 2019-06-28 NOTE — PROGRESS NOTE ADULT - SUBJECTIVE AND OBJECTIVE BOX
Patient evaluated at bedside for clinical magnesium check.     She denies visual disturbances including scotoma, headache and right upper quadrant pain. Also denies nausea/vomiting/epigastric pain/shortness of breath. Pain well controlled.      T(C): 36.9 (06-28-19 @ 10:30), Max: 36.9 (06-28-19 @ 10:30)  HR: 73 (06-28-19 @ 16:33) (57 - 96)  BP: 109/62 (06-28-19 @ 16:33) (95/50 - 143/58)  RR: 18 (06-28-19 @ 16:33) (18 - 18)  SpO2: 98% (06-28-19 @ 16:33) (95% - 100    Gen: NAD  Pulm: CTAB  Abd: soft, nontender, no rebound or guarding, no epigastric tenderness, liver nonpalpable +BS, fundus palpated   : Pinto in place  Ext: Reflexes +2                          10.2   12.92 )-----------( 193      ( 28 Jun 2019 16:34 )             31.3     06-28    136  |  103  |  4<L>  ----------------------------<  100<H>  3.6   |  22  |  0.40<L>    Ca    6.7<L>      28 Jun 2019 16:34  Mg     6.1     06-28    TPro  5.8<L>  /  Alb  3.0<L>  /  TBili  0.2  /  DBili  x   /  AST  17  /  ALT  8<L>  /  AlkPhos  79  06-28 06-27-19 @ 07:01  -  06-28-19 @ 07:00  --------------------------------------------------------  IN: 3500 mL / OUT: 1200 mL / NET: 2300 mL    06-28-19 @ 07:01  -  06-28-19 @ 19:04  --------------------------------------------------------  IN: 0 mL / OUT: 1745 mL / NET: -1745 mL

## 2019-06-28 NOTE — PROGRESS NOTE ADULT - ASSESSMENT
A&P: 38y Female  s/p  repeat c/s complicated by preeclampsia with severe features.      1. Preeclampsia:   Continue IV Magnesium for 24 hrs post delivery.   Magnesium clinical checks and serum assay q6 hrs.  Next Mg check @ midnight   No complaints currently.   BP controlled  2. GI: Diet: Clears as tolerated  3. Neuro: PO pain medications PRN, hold NSAIDs  4. : strict Is and Os

## 2019-06-29 LAB
HCT VFR BLD CALC: 30.4 % — LOW (ref 34.5–45)
HGB BLD-MCNC: 9.7 G/DL — LOW (ref 11.5–15.5)
MAGNESIUM SERPL-MCNC: 5.5 MG/DL — HIGH (ref 1.6–2.6)
MCHC RBC-ENTMCNC: 29.7 PG — SIGNIFICANT CHANGE UP (ref 27–34)
MCHC RBC-ENTMCNC: 31.9 GM/DL — LOW (ref 32–36)
MCV RBC AUTO: 93 FL — SIGNIFICANT CHANGE UP (ref 80–100)
NRBC # BLD: 0 /100 WBCS — SIGNIFICANT CHANGE UP (ref 0–0)
PLATELET # BLD AUTO: 213 K/UL — SIGNIFICANT CHANGE UP (ref 150–400)
RBC # BLD: 3.27 M/UL — LOW (ref 3.8–5.2)
RBC # FLD: 14.6 % — HIGH (ref 10.3–14.5)
WBC # BLD: 9.84 K/UL — SIGNIFICANT CHANGE UP (ref 3.8–10.5)
WBC # FLD AUTO: 9.84 K/UL — SIGNIFICANT CHANGE UP (ref 3.8–10.5)

## 2019-06-29 RX ORDER — MORPHINE SULFATE 50 MG/1
2 CAPSULE, EXTENDED RELEASE ORAL ONCE
Refills: 0 | Status: DISCONTINUED | OUTPATIENT
Start: 2019-06-29 | End: 2019-06-29

## 2019-06-29 RX ORDER — ZOLPIDEM TARTRATE 10 MG/1
5 TABLET ORAL AT BEDTIME
Refills: 0 | Status: DISCONTINUED | OUTPATIENT
Start: 2019-06-29 | End: 2019-07-02

## 2019-06-29 RX ORDER — IBUPROFEN 200 MG
600 TABLET ORAL EVERY 6 HOURS
Refills: 0 | Status: DISCONTINUED | OUTPATIENT
Start: 2019-06-29 | End: 2019-07-02

## 2019-06-29 RX ORDER — OXYCODONE HYDROCHLORIDE 5 MG/1
10 TABLET ORAL ONCE
Refills: 0 | Status: DISCONTINUED | OUTPATIENT
Start: 2019-06-29 | End: 2019-06-29

## 2019-06-29 RX ORDER — OXYCODONE HYDROCHLORIDE 5 MG/1
10 TABLET ORAL EVERY 6 HOURS
Refills: 0 | Status: DISCONTINUED | OUTPATIENT
Start: 2019-06-29 | End: 2019-07-02

## 2019-06-29 RX ADMIN — Medication 600 MILLIGRAM(S): at 13:36

## 2019-06-29 RX ADMIN — Medication 100 MILLIGRAM(S): at 18:27

## 2019-06-29 RX ADMIN — Medication 600 MILLIGRAM(S): at 18:29

## 2019-06-29 RX ADMIN — OXYCODONE HYDROCHLORIDE 5 MILLIGRAM(S): 5 TABLET ORAL at 07:04

## 2019-06-29 RX ADMIN — HEPARIN SODIUM 5000 UNIT(S): 5000 INJECTION INTRAVENOUS; SUBCUTANEOUS at 06:08

## 2019-06-29 RX ADMIN — SODIUM CHLORIDE 75 MILLILITER(S): 9 INJECTION, SOLUTION INTRAVENOUS at 00:05

## 2019-06-29 RX ADMIN — Medication 975 MILLIGRAM(S): at 14:50

## 2019-06-29 RX ADMIN — Medication 975 MILLIGRAM(S): at 20:15

## 2019-06-29 RX ADMIN — SIMETHICONE 80 MILLIGRAM(S): 80 TABLET, CHEWABLE ORAL at 10:19

## 2019-06-29 RX ADMIN — Medication 30 MILLIGRAM(S): at 01:42

## 2019-06-29 RX ADMIN — OXYCODONE HYDROCHLORIDE 10 MILLIGRAM(S): 5 TABLET ORAL at 08:04

## 2019-06-29 RX ADMIN — OXYCODONE HYDROCHLORIDE 5 MILLIGRAM(S): 5 TABLET ORAL at 07:42

## 2019-06-29 RX ADMIN — Medication 600 MILLIGRAM(S): at 06:26

## 2019-06-29 RX ADMIN — Medication 30 MILLIGRAM(S): at 02:12

## 2019-06-29 RX ADMIN — Medication 600 MILLIGRAM(S): at 12:36

## 2019-06-29 RX ADMIN — MORPHINE SULFATE 2 MILLIGRAM(S): 50 CAPSULE, EXTENDED RELEASE ORAL at 13:36

## 2019-06-29 RX ADMIN — Medication 975 MILLIGRAM(S): at 06:10

## 2019-06-29 RX ADMIN — Medication 975 MILLIGRAM(S): at 01:45

## 2019-06-29 RX ADMIN — OXYCODONE HYDROCHLORIDE 10 MILLIGRAM(S): 5 TABLET ORAL at 19:21

## 2019-06-29 RX ADMIN — Medication 100 MILLIGRAM(S): at 06:23

## 2019-06-29 RX ADMIN — Medication 600 MILLIGRAM(S): at 07:12

## 2019-06-29 RX ADMIN — OXYCODONE HYDROCHLORIDE 5 MILLIGRAM(S): 5 TABLET ORAL at 06:08

## 2019-06-29 RX ADMIN — HEPARIN SODIUM 5000 UNIT(S): 5000 INJECTION INTRAVENOUS; SUBCUTANEOUS at 18:27

## 2019-06-29 RX ADMIN — Medication 975 MILLIGRAM(S): at 13:53

## 2019-06-29 RX ADMIN — OXYCODONE HYDROCHLORIDE 10 MILLIGRAM(S): 5 TABLET ORAL at 20:15

## 2019-06-29 RX ADMIN — Medication 975 MILLIGRAM(S): at 07:12

## 2019-06-29 RX ADMIN — Medication 600 MILLIGRAM(S): at 19:29

## 2019-06-29 RX ADMIN — MORPHINE SULFATE 2 MILLIGRAM(S): 50 CAPSULE, EXTENDED RELEASE ORAL at 12:38

## 2019-06-29 RX ADMIN — SIMETHICONE 80 MILLIGRAM(S): 80 TABLET, CHEWABLE ORAL at 06:23

## 2019-06-29 RX ADMIN — Medication 975 MILLIGRAM(S): at 19:21

## 2019-06-29 RX ADMIN — SIMETHICONE 80 MILLIGRAM(S): 80 TABLET, CHEWABLE ORAL at 17:17

## 2019-06-29 NOTE — PROGRESS NOTE ADULT - SUBJECTIVE AND OBJECTIVE BOX
S: Pt evaluated at bedside. She denies visual disturbances, headache and right upper quadrant pain. Also denies nausea/vomiting/epigastric pain/shortness of breath. Pain overall controlled. Patient has not yet passed gas feels bloated.     Physical Exam:  Vital Signs Last 24 Hrs  T(C): 36.5 (28 Jun 2019 18:30), Max: 36.9 (28 Jun 2019 10:30)  T(F): 97.7 (28 Jun 2019 18:30), Max: 98.5 (28 Jun 2019 10:30)  HR: 73 (28 Jun 2019 18:30) (57 - 96)  BP: 105/60 (28 Jun 2019 18:30) (95/50 - 143/58)  BP(mean): --  RR: 18 (28 Jun 2019 18:30) (18 - 18)  SpO2: 98% (28 Jun 2019 18:30) (95% - 100%)                        10.2   12.92 )-----------( 193      ( 28 Jun 2019 16:34 )             31.3     06-28    136  |  103  |  4<L>  ----------------------------<  100<H>  3.6   |  22  |  0.40<L>    Ca    6.7<L>      28 Jun 2019 16:34  Mg     5.5     06-29    TPro  5.8<L>  /  Alb  3.0<L>  /  TBili  0.2  /  DBili  x   /  AST  17  /  ALT  8<L>  /  AlkPhos  79  06-28    Magnesium, Serum: 5.5 mg/dL (06-29 @ 00:13)    PT/INR - ( 28 Jun 2019 16:34 )   PT: 11.1 sec;   INR: 0.98          PTT - ( 28 Jun 2019 16:34 )  PTT:26.9 sec  N: 2000 mL / OUT: 4704 mL / NET: -2704 mL

## 2019-06-29 NOTE — PROGRESS NOTE ADULT - ASSESSMENT
38y Female POD#1  s/p C/S  PEC w/ SF: s/p IV Mg, BPs wnl o/n, denies toxic symptoms, stable, am cbc pending f/u Hb  1. Neuro/Pain:  OPM, oxycodone, morphine for breakthrough x1, ambien for sleep  2  CV:  VS per routine  3. Pulm: Encourage ISS & Ambulation  4. GI:  Advance as regi  5. : Pinto in place, TOV  6. DVT ppx: SCDs, SQH 5000 mg BID  7. Dispo: POD #3 or #4

## 2019-06-29 NOTE — PROGRESS NOTE ADULT - ASSESSMENT
A&P  38y Female  s/p  repeat c/s complicated by preeclampsia with severe features.    1. Preeclampsia:   Continue IV Magnesium @2G/hr for 24 hrs post delivery.   Magnesium clinical checks and serum assay q6 hrs. D/c Mg at 5am  No complaints currently.   BP controlled  2. GI: Diet: Clears as tolerated  3. Neuro: PO pain medications PRN, hold NSAIDs  4. : strict Is and Os, fraire in place A&P  38y Female  s/p  repeat c/s complicated by preeclampsia with severe features.    1. Preeclampsia:   Continue IV Magnesium @2G/hr for 24 hrs post delivery.   Magnesium clinical checks and serum assay q6 hrs. D/c Mg at 5am  C/o some SOB though likely due to pain/ difficulty taking in a deep breath, SpO2 93-98% on RA; continue tylenol and motrin for pain and incentive spirometry, reassess in AM.   BPs wnl  2. GI: Diet: Clears as tolerated  3. Neuro: PO pain medications PRN,   4. : strict Is and Os, fraire in place

## 2019-06-29 NOTE — PROGRESS NOTE ADULT - ASSESSMENT
A&P: 38y Female  s/p CS for atypical preeclampsia  1. Preeclampsia: s/p 24 hours of IV magnesium  2. VTE: SCDs, SQH if indicated.   3. GI: Diet: regular diet   4. Neuro: Oral pain medications as needed  5. : strict Is and Os, D/C fraire after discontinuation of IV Magnesium  6. Discharge planning: discussed importance of BP check at home, will be evaluated by PP nursing re need for VNS. Discussed close follow up with OB within 1-2 wks. Reviewed toxic complaints with patient.

## 2019-06-29 NOTE — PROGRESS NOTE ADULT - SUBJECTIVE AND OBJECTIVE BOX
Patient evaluated at bedside for clinical magnesium check. She complains of SOB/ difficulty taking in a deep breath due to pain in her abdomen and back. She denies chest pain. Also denies HA, blurry vision, scotoma, RUQ pain, or n/v.      T(C): 36.5 (06-28-19 @ 18:30), Max: 36.5 (06-28-19 @ 18:30)  HR: 73 (06-28-19 @ 18:30) (73 - 73)  BP: 105/60 (06-28-19 @ 18:30) (105/60 - 109/62)  RR: 18 (06-28-19 @ 18:30) (18 - 18)  SpO2: 98% (06-28-19 @ 18:30) (98% - 99%)  Wt(kg): --    Gen: NAD  Pulm: CTAB  Abd: soft, nontender, no rebound or guarding, no epigastric tenderness, liver nonpalpable +BS, fundus palpated   : Pinto in place  Ext: Reflexes 1+ bilaterally                          10.2   12.92 )-----------( 193      ( 28 Jun 2019 16:34 )             31.3     06-28    136  |  103  |  4<L>  ----------------------------<  100<H>  3.6   |  22  |  0.40<L>    Ca    6.7<L>      28 Jun 2019 16:34  Mg     5.5     06-29    TPro  5.8<L>  /  Alb  3.0<L>  /  TBili  0.2  /  DBili  x   /  AST  17  /  ALT  8<L>  /  AlkPhos  79  06-28 06-27-19 @ 07:01  -  06-28-19 @ 07:00  --------------------------------------------------------  IN: 3500 mL / OUT: 1200 mL / NET: 2300 mL    06-28-19 @ 07:01  -  06-29-19 @ 01:18  --------------------------------------------------------  IN: 2000 mL / OUT: 3854 mL / NET: -1854 mL

## 2019-06-29 NOTE — PROGRESS NOTE ADULT - SUBJECTIVE AND OBJECTIVE BOX
Patient evaluated at bedside.   She c/o pain that improved with oxycodone; would like to continue to have oxycodone and morphine for breakthrough pain. Also requesting ambien for sleep, will order PRN.  Voiding, passing flatus, OOB    She denies HA, dizziness, CP, palpitations, SOB, n/v, or heavy vaginal bleeding.    Physical Exam:  Vital Signs Last 24 Hrs  T(C): 36.7 (29 Jun 2019 04:00), Max: 36.9 (28 Jun 2019 10:30)  T(F): 98.1 (29 Jun 2019 04:00), Max: 98.5 (28 Jun 2019 10:30)  HR: 79 (29 Jun 2019 04:00) (67 - 96)  BP: 104/62 (29 Jun 2019 04:00) (104/62 - 121/66)  BP(mean): --  RR: 18 (29 Jun 2019 04:00) (18 - 18)  SpO2: 95% (29 Jun 2019 04:00) (95% - 99%)    Gen: NAD  Abd: + BS, soft, nontender, nondistended, no rebound or guarding  Incision clean, dry and intact  uterus firm at midline  : lochia WNL  Extremities: no swelling or calf tenderness                          10.2   12.92 )-----------( 193      ( 28 Jun 2019 16:34 )             31.3     06-28    136  |  103  |  4<L>  ----------------------------<  100<H>  3.6   |  22  |  0.40<L>    Ca    6.7<L>      28 Jun 2019 16:34  Mg     5.5     06-29    TPro  5.8<L>  /  Alb  3.0<L>  /  TBili  0.2  /  DBili  x   /  AST  17  /  ALT  8<L>  /  AlkPhos  79  06-28    Magnesium, Serum: 5.5 mg/dL (06-29 @ 00:13)    PT/INR - ( 28 Jun 2019 16:34 )   PT: 11.1 sec;   INR: 0.98          PTT - ( 28 Jun 2019 16:34 )  PTT:26.9 sec Patient evaluated at bedside.   She c/o pain that improved with oxycodone, likely gas pain as it radiates to her shoulders and ribs; would like to continue to have oxycodone and morphine for breakthrough pain. Also requesting ambien for sleep, will order PRN.  Voiding, passing flatus, OOB    She denies HA, dizziness, CP, palpitations, SOB, n/v, or heavy vaginal bleeding.    Physical Exam:  Vital Signs Last 24 Hrs  T(C): 36.7 (29 Jun 2019 04:00), Max: 36.9 (28 Jun 2019 10:30)  T(F): 98.1 (29 Jun 2019 04:00), Max: 98.5 (28 Jun 2019 10:30)  HR: 79 (29 Jun 2019 04:00) (67 - 96)  BP: 104/62 (29 Jun 2019 04:00) (104/62 - 121/66)  BP(mean): --  RR: 18 (29 Jun 2019 04:00) (18 - 18)  SpO2: 95% (29 Jun 2019 04:00) (95% - 99%)    Gen: NAD  Abd: + BS, soft, nontender, nondistended, no rebound or guarding  Incision clean, dry and intact  uterus firm at midline  : lochia WNL  Extremities: no swelling or calf tenderness                          10.2   12.92 )-----------( 193      ( 28 Jun 2019 16:34 )             31.3     06-28    136  |  103  |  4<L>  ----------------------------<  100<H>  3.6   |  22  |  0.40<L>    Ca    6.7<L>      28 Jun 2019 16:34  Mg     5.5     06-29    TPro  5.8<L>  /  Alb  3.0<L>  /  TBili  0.2  /  DBili  x   /  AST  17  /  ALT  8<L>  /  AlkPhos  79  06-28    Magnesium, Serum: 5.5 mg/dL (06-29 @ 00:13)    PT/INR - ( 28 Jun 2019 16:34 )   PT: 11.1 sec;   INR: 0.98          PTT - ( 28 Jun 2019 16:34 )  PTT:26.9 sec

## 2019-06-30 RX ADMIN — Medication 600 MILLIGRAM(S): at 19:17

## 2019-06-30 RX ADMIN — SIMETHICONE 80 MILLIGRAM(S): 80 TABLET, CHEWABLE ORAL at 21:21

## 2019-06-30 RX ADMIN — HEPARIN SODIUM 5000 UNIT(S): 5000 INJECTION INTRAVENOUS; SUBCUTANEOUS at 19:31

## 2019-06-30 RX ADMIN — Medication 975 MILLIGRAM(S): at 21:16

## 2019-06-30 RX ADMIN — Medication 975 MILLIGRAM(S): at 02:31

## 2019-06-30 RX ADMIN — Medication 100 MILLIGRAM(S): at 01:58

## 2019-06-30 RX ADMIN — HEPARIN SODIUM 5000 UNIT(S): 5000 INJECTION INTRAVENOUS; SUBCUTANEOUS at 05:43

## 2019-06-30 RX ADMIN — OXYCODONE HYDROCHLORIDE 10 MILLIGRAM(S): 5 TABLET ORAL at 22:00

## 2019-06-30 RX ADMIN — OXYCODONE HYDROCHLORIDE 10 MILLIGRAM(S): 5 TABLET ORAL at 09:08

## 2019-06-30 RX ADMIN — SIMETHICONE 80 MILLIGRAM(S): 80 TABLET, CHEWABLE ORAL at 01:59

## 2019-06-30 RX ADMIN — OXYCODONE HYDROCHLORIDE 10 MILLIGRAM(S): 5 TABLET ORAL at 04:02

## 2019-06-30 RX ADMIN — Medication 600 MILLIGRAM(S): at 20:00

## 2019-06-30 RX ADMIN — Medication 600 MILLIGRAM(S): at 12:30

## 2019-06-30 RX ADMIN — Medication 975 MILLIGRAM(S): at 13:55

## 2019-06-30 RX ADMIN — Medication 600 MILLIGRAM(S): at 12:03

## 2019-06-30 RX ADMIN — Medication 975 MILLIGRAM(S): at 14:30

## 2019-06-30 RX ADMIN — SIMETHICONE 80 MILLIGRAM(S): 80 TABLET, CHEWABLE ORAL at 12:13

## 2019-06-30 RX ADMIN — Medication 975 MILLIGRAM(S): at 22:00

## 2019-06-30 RX ADMIN — Medication 100 MILLIGRAM(S): at 12:13

## 2019-06-30 RX ADMIN — Medication 975 MILLIGRAM(S): at 02:42

## 2019-06-30 RX ADMIN — Medication 600 MILLIGRAM(S): at 02:30

## 2019-06-30 RX ADMIN — OXYCODONE HYDROCHLORIDE 10 MILLIGRAM(S): 5 TABLET ORAL at 16:17

## 2019-06-30 RX ADMIN — OXYCODONE HYDROCHLORIDE 10 MILLIGRAM(S): 5 TABLET ORAL at 15:24

## 2019-06-30 RX ADMIN — OXYCODONE HYDROCHLORIDE 10 MILLIGRAM(S): 5 TABLET ORAL at 21:20

## 2019-06-30 RX ADMIN — Medication 600 MILLIGRAM(S): at 01:59

## 2019-06-30 RX ADMIN — ZOLPIDEM TARTRATE 5 MILLIGRAM(S): 10 TABLET ORAL at 01:52

## 2019-06-30 RX ADMIN — ZOLPIDEM TARTRATE 5 MILLIGRAM(S): 10 TABLET ORAL at 23:09

## 2019-06-30 RX ADMIN — OXYCODONE HYDROCHLORIDE 10 MILLIGRAM(S): 5 TABLET ORAL at 10:02

## 2019-06-30 RX ADMIN — OXYCODONE HYDROCHLORIDE 10 MILLIGRAM(S): 5 TABLET ORAL at 02:54

## 2019-06-30 RX ADMIN — Medication 975 MILLIGRAM(S): at 02:03

## 2019-06-30 NOTE — PROGRESS NOTE ADULT - ASSESSMENT
38y Female POD#2    s/p C/S, Uncomplicated                                     PEC w/ SF: s/p IV Mg, BPs wnl o/n, denies toxic symptoms, continue to monitor VS and symptoms  1. Neuro/Pain:  OPM  2  CV:  VS per routine  3. Pulm: Encourage ISS & Ambulation  4. GI:  Reg  5. : Voiding  6. DVT ppx: SCDs, SQH 5000 mg BID  7. Dispo: POD #3 or #4

## 2019-06-30 NOTE — PROGRESS NOTE ADULT - ATTENDING COMMENTS
Patient seen and examined at bedside. Agree with above evaluation. Plan to continue to monitor patient and pain control. BP wnl. Plan for discharge POD 3 or 4 if patient remains hemodynamically stable

## 2019-06-30 NOTE — PROGRESS NOTE ADULT - SUBJECTIVE AND OBJECTIVE BOX
Patient evaluated at bedside.   She reports pain is well controlled with OPM.  She has been ambulating without assistance, voiding spontaneously, passing gas, tolerating regular diet and is breastfeeding.    She denies HA, dizziness, CP, palpitations, SOB, n/v, or heavy vaginal bleeding.    Physical Exam:  Vital Signs Last 24 Hrs  T(C): 36.7 (30 Jun 2019 06:00), Max: 36.7 (29 Jun 2019 10:00)  T(F): 98.1 (30 Jun 2019 06:00), Max: 98.1 (29 Jun 2019 10:00)  HR: 60 (30 Jun 2019 06:00) (60 - 89)  BP: 104/69 (30 Jun 2019 06:00) (102/69 - 114/81)  BP(mean): --  RR: 19 (30 Jun 2019 06:00) (17 - 19)  SpO2: 96% (30 Jun 2019 06:00) (95% - 96%)    Gen: NAD  Abd: + BS, soft, nontender, nondistended, no rebound or guarding  Incision clean, dry and intact  uterus firm at midline  : lochia WNL  Extremities: no swelling or calf tenderness                          9.7    9.84  )-----------( 213      ( 29 Jun 2019 11:12 )             30.4     06-28    136  |  103  |  4<L>  ----------------------------<  100<H>  3.6   |  22  |  0.40<L>    Ca    6.7<L>      28 Jun 2019 16:34  Mg     5.5     06-29    TPro  5.8<L>  /  Alb  3.0<L>  /  TBili  0.2  /  DBili  x   /  AST  17  /  ALT  8<L>  /  AlkPhos  79  06-28      PT/INR - ( 28 Jun 2019 16:34 )   PT: 11.1 sec;   INR: 0.98          PTT - ( 28 Jun 2019 16:34 )  PTT:26.9 sec

## 2019-07-01 RX ADMIN — Medication 975 MILLIGRAM(S): at 22:45

## 2019-07-01 RX ADMIN — Medication 100 MILLIGRAM(S): at 22:46

## 2019-07-01 RX ADMIN — OXYCODONE HYDROCHLORIDE 10 MILLIGRAM(S): 5 TABLET ORAL at 04:30

## 2019-07-01 RX ADMIN — OXYCODONE HYDROCHLORIDE 10 MILLIGRAM(S): 5 TABLET ORAL at 09:40

## 2019-07-01 RX ADMIN — Medication 975 MILLIGRAM(S): at 09:40

## 2019-07-01 RX ADMIN — Medication 975 MILLIGRAM(S): at 03:31

## 2019-07-01 RX ADMIN — SIMETHICONE 80 MILLIGRAM(S): 80 TABLET, CHEWABLE ORAL at 22:47

## 2019-07-01 RX ADMIN — OXYCODONE HYDROCHLORIDE 10 MILLIGRAM(S): 5 TABLET ORAL at 03:32

## 2019-07-01 RX ADMIN — Medication 600 MILLIGRAM(S): at 12:32

## 2019-07-01 RX ADMIN — OXYCODONE HYDROCHLORIDE 10 MILLIGRAM(S): 5 TABLET ORAL at 09:00

## 2019-07-01 RX ADMIN — Medication 600 MILLIGRAM(S): at 13:20

## 2019-07-01 RX ADMIN — Medication 975 MILLIGRAM(S): at 09:00

## 2019-07-01 RX ADMIN — HEPARIN SODIUM 5000 UNIT(S): 5000 INJECTION INTRAVENOUS; SUBCUTANEOUS at 19:11

## 2019-07-01 RX ADMIN — Medication 600 MILLIGRAM(S): at 07:30

## 2019-07-01 RX ADMIN — Medication 600 MILLIGRAM(S): at 19:12

## 2019-07-01 RX ADMIN — OXYCODONE HYDROCHLORIDE 10 MILLIGRAM(S): 5 TABLET ORAL at 16:00

## 2019-07-01 RX ADMIN — Medication 600 MILLIGRAM(S): at 06:32

## 2019-07-01 RX ADMIN — Medication 975 MILLIGRAM(S): at 14:00

## 2019-07-01 RX ADMIN — Medication 975 MILLIGRAM(S): at 23:45

## 2019-07-01 RX ADMIN — HEPARIN SODIUM 5000 UNIT(S): 5000 INJECTION INTRAVENOUS; SUBCUTANEOUS at 06:34

## 2019-07-01 RX ADMIN — Medication 975 MILLIGRAM(S): at 04:30

## 2019-07-01 RX ADMIN — OXYCODONE HYDROCHLORIDE 10 MILLIGRAM(S): 5 TABLET ORAL at 22:46

## 2019-07-01 RX ADMIN — ZOLPIDEM TARTRATE 5 MILLIGRAM(S): 10 TABLET ORAL at 22:49

## 2019-07-01 RX ADMIN — Medication 100 MILLIGRAM(S): at 03:43

## 2019-07-01 NOTE — PROGRESS NOTE ADULT - SUBJECTIVE AND OBJECTIVE BOX
Patient evaluated at bedside.   She reports pain is well controlled with current pain medications.  She has been ambulating without assistance, voiding spontaneously, passing gas, tolerating regular diet and is breastfeeding.    She denies HA, blurry vision, scotoma, dizziness, CP, palpitations, SOB, n/v, or heavy vaginal bleeding.    Physical Exam:  Vital Signs Last 24 Hrs  T(C): 36.9 (01 Jul 2019 02:01), Max: 36.9 (01 Jul 2019 02:01)  T(F): 98.4 (01 Jul 2019 02:01), Max: 98.4 (01 Jul 2019 02:01)  HR: 67 (01 Jul 2019 02:01) (67 - 81)  BP: 102/70 (01 Jul 2019 02:01) (102/70 - 129/86)  BP(mean): --  RR: 17 (01 Jul 2019 02:01) (17 - 19)  SpO2: 98% (01 Jul 2019 02:01) (96% - 98%)    Gen: NAD  Abd: + BS, soft, nontender, nondistended, no rebound or guarding  Incision clean, dry and intact  uterus firm at midline  : lochia WNL  Extremities: no swelling or calf tenderness                          9.7    9.84  )-----------( 213      ( 29 Jun 2019 11:12 )             30.4         MEDICATIONS  (STANDING):  acetaminophen   Tablet .. 975 milliGRAM(s) Oral <User Schedule>  chlorhexidine 2% Cloths 1 Application(s) Topical daily  diphtheria/tetanus/pertussis (acellular) Vaccine (ADAcel) 0.5 milliLiter(s) IntraMuscular once  heparin  Injectable 5000 Unit(s) SubCutaneous every 12 hours  ibuprofen  Tablet. 600 milliGRAM(s) Oral every 6 hours  lactated ringers. 1000 milliLiter(s) (75 mL/Hr) IV Continuous <Continuous>  morphine PF Spinal 0.2 milliGRAM(s) IntraThecal. once  oxytocin Infusion 333.333 milliUNIT(s)/Min (1000 mL/Hr) IV Continuous <Continuous>  oxytocin Infusion 333.333 milliUNIT(s)/Min (1000 mL/Hr) IV Continuous <Continuous>    MEDICATIONS  (PRN):  aluminum hydroxide/magnesium hydroxide/simethicone Suspension 30 milliLiter(s) Oral every 6 hours PRN Dyspepsia  dexamethasone  Injectable 4 milliGRAM(s) IV Push every 6 hours PRN Nausea  diphenhydrAMINE 25 milliGRAM(s) Oral every 6 hours PRN Itching  diphenhydrAMINE   Injectable 25 milliGRAM(s) IV Push every 4 hours PRN Insomnia  docusate sodium 100 milliGRAM(s) Oral two times a day PRN Stool softening  glycerin Suppository - Adult 1 Suppository(s) Rectal at bedtime PRN Constipation  lanolin Ointment 1 Application(s) Topical every 6 hours PRN Sore Nipples  magnesium hydroxide Suspension 30 milliLiter(s) Oral two times a day PRN Constipation  metoclopramide Injectable 10 milliGRAM(s) IV Push once PRN Nausea and/or Vomiting  naloxone Injectable 0.1 milliGRAM(s) IV Push every 3 minutes PRN For ANY of the following changes in patient status:  A. Breaths Per Minute LESS THAN 10, B. Oxygen saturation LESS THAN 90%, C. Sedation score of 6 for Stop After: 4 Times  ondansetron Injectable 4 milliGRAM(s) IV Push every 6 hours PRN Nausea  oxyCODONE    IR 10 milliGRAM(s) Oral every 6 hours PRN Severe Pain (7 - 10)  oxyCODONE    IR 5 milliGRAM(s) Oral every 3 hours PRN Moderate to Severe Pain (4-10)  simethicone 80 milliGRAM(s) Chew every 4 hours PRN Gas  zolpidem 5 milliGRAM(s) Oral at bedtime PRN Insomnia

## 2019-07-02 ENCOUNTER — TRANSCRIPTION ENCOUNTER (OUTPATIENT)
Age: 39
End: 2019-07-02

## 2019-07-02 VITALS
OXYGEN SATURATION: 96 % | TEMPERATURE: 98 F | HEART RATE: 74 BPM | DIASTOLIC BLOOD PRESSURE: 73 MMHG | RESPIRATION RATE: 18 BRPM | SYSTOLIC BLOOD PRESSURE: 108 MMHG

## 2019-07-02 PROCEDURE — 86900 BLOOD TYPING SEROLOGIC ABO: CPT

## 2019-07-02 PROCEDURE — 90715 TDAP VACCINE 7 YRS/> IM: CPT

## 2019-07-02 PROCEDURE — 83735 ASSAY OF MAGNESIUM: CPT

## 2019-07-02 PROCEDURE — 88304 TISSUE EXAM BY PATHOLOGIST: CPT

## 2019-07-02 PROCEDURE — 82570 ASSAY OF URINE CREATININE: CPT

## 2019-07-02 PROCEDURE — 85384 FIBRINOGEN ACTIVITY: CPT

## 2019-07-02 PROCEDURE — 88307 TISSUE EXAM BY PATHOLOGIST: CPT

## 2019-07-02 PROCEDURE — 84156 ASSAY OF PROTEIN URINE: CPT

## 2019-07-02 PROCEDURE — 86850 RBC ANTIBODY SCREEN: CPT

## 2019-07-02 PROCEDURE — 86780 TREPONEMA PALLIDUM: CPT

## 2019-07-02 PROCEDURE — 85025 COMPLETE CBC W/AUTO DIFF WBC: CPT

## 2019-07-02 PROCEDURE — 85027 COMPLETE CBC AUTOMATED: CPT

## 2019-07-02 PROCEDURE — 99214 OFFICE O/P EST MOD 30 MIN: CPT

## 2019-07-02 PROCEDURE — 81001 URINALYSIS AUTO W/SCOPE: CPT

## 2019-07-02 PROCEDURE — 84550 ASSAY OF BLOOD/URIC ACID: CPT

## 2019-07-02 PROCEDURE — 83615 LACTATE (LD) (LDH) ENZYME: CPT

## 2019-07-02 PROCEDURE — 86901 BLOOD TYPING SEROLOGIC RH(D): CPT

## 2019-07-02 PROCEDURE — 36415 COLL VENOUS BLD VENIPUNCTURE: CPT

## 2019-07-02 PROCEDURE — 85610 PROTHROMBIN TIME: CPT

## 2019-07-02 PROCEDURE — 85730 THROMBOPLASTIN TIME PARTIAL: CPT

## 2019-07-02 PROCEDURE — 80053 COMPREHEN METABOLIC PANEL: CPT

## 2019-07-02 RX ORDER — TETANUS TOXOID, REDUCED DIPHTHERIA TOXOID AND ACELLULAR PERTUSSIS VACCINE, ADSORBED 5; 2.5; 8; 8; 2.5 [IU]/.5ML; [IU]/.5ML; UG/.5ML; UG/.5ML; UG/.5ML
0.5 SUSPENSION INTRAMUSCULAR ONCE
Refills: 0 | Status: COMPLETED | OUTPATIENT
Start: 2019-07-02 | End: 2019-07-02

## 2019-07-02 RX ADMIN — Medication 975 MILLIGRAM(S): at 05:30

## 2019-07-02 RX ADMIN — Medication 975 MILLIGRAM(S): at 04:58

## 2019-07-02 RX ADMIN — Medication 600 MILLIGRAM(S): at 09:30

## 2019-07-02 RX ADMIN — Medication 600 MILLIGRAM(S): at 02:00

## 2019-07-02 RX ADMIN — SIMETHICONE 80 MILLIGRAM(S): 80 TABLET, CHEWABLE ORAL at 04:58

## 2019-07-02 RX ADMIN — Medication 600 MILLIGRAM(S): at 01:13

## 2019-07-02 RX ADMIN — HEPARIN SODIUM 5000 UNIT(S): 5000 INJECTION INTRAVENOUS; SUBCUTANEOUS at 07:03

## 2019-07-02 RX ADMIN — OXYCODONE HYDROCHLORIDE 10 MILLIGRAM(S): 5 TABLET ORAL at 04:56

## 2019-07-02 RX ADMIN — TETANUS TOXOID, REDUCED DIPHTHERIA TOXOID AND ACELLULAR PERTUSSIS VACCINE, ADSORBED 0.5 MILLILITER(S): 5; 2.5; 8; 8; 2.5 SUSPENSION INTRAMUSCULAR at 09:30

## 2019-07-02 NOTE — DISCHARGE NOTE OB - MATERIALS PROVIDED
Guide to Postpartum Care/Back To Sleep Handout/[facility] [place] Screening Program/  Immunization Record/MMR Vaccination (VIS Pub Date: 2012)/Tdap Vaccination (VIS Pub Date: 2012)/Bottle Feeding Log/Shaken Baby Prevention Handout/[facility] Hearing Screen Program

## 2019-07-02 NOTE — PROGRESS NOTE ADULT - ASSESSMENT
38y Female POD#4   s/p C/S, Uncomplicated                                     PEC w/ SF: s/p IV Mg 6/27-6/29, BPs wnl o/n, denies toxic symptoms, continue to monitor VS and symptoms  1. Neuro/Pain:  OPM  2  CV:  VS per routine  3. Pulm: Encourage ISS & Ambulation  4. GI:  Reg  5. : Voiding  6. DVT ppx: SCDs, SQH 5000 mg BID  7. Dispo: POD #4 (she would like to go home today)

## 2019-07-02 NOTE — PROGRESS NOTE ADULT - SUBJECTIVE AND OBJECTIVE BOX
Patient evaluated at bedside.   She reports pain is well controlled with OPM.  She has been ambulating without assistance, voiding spontaneously, passing gas, tolerating regular diet and is breastfeeding.    She denies HA, dizziness, CP, palpitations, SOB, n/v, or heavy vaginal bleeding.    Physical Exam:  Vital Signs Last 24 Hrs  T(C): 36.8 (02 Jul 2019 06:17), Max: 36.9 (01 Jul 2019 14:14)  T(F): 98.3 (02 Jul 2019 06:17), Max: 98.4 (01 Jul 2019 14:14)  HR: 74 (02 Jul 2019 06:17) (61 - 77)  BP: 108/73 (02 Jul 2019 06:17) (107/72 - 118/77)  BP(mean): --  RR: 18 (02 Jul 2019 06:17) (18 - 18)  SpO2: 96% (02 Jul 2019 06:17) (96% - 99%)    Gen: NAD  Abd: + BS, soft, nontender, nondistended, no rebound or guarding  Incision clean, dry and intact  uterus firm at midline  : lochia WNL  Extremities: no swelling or calf tenderness

## 2019-07-02 NOTE — DISCHARGE NOTE OB - PATIENT PORTAL LINK FT
You can access the [facility] Patient Portal, offered by [facility], by registering with the following website: [email or web address]

## 2019-07-02 NOTE — DISCHARGE NOTE OB - CARE PLAN
Principal Discharge DX:	Postpartum state  Goal:	Feel well!  Assessment and plan of treatment:	 delivery, complicated by preeclampsia.  Doing well postpartum, follow up in 1 week for blood pressure check.

## 2019-07-02 NOTE — DISCHARGE NOTE OB - CARE PROVIDER_API CALL
Stefani Morin)  Obstetrics and Gynecology  [facility], [address]  [place] [address]  Phone: [phone]  Fax: [phone]  Follow Up Time:

## 2019-07-02 NOTE — DISCHARGE NOTE OB - PLAN OF CARE
Feel well!  delivery, complicated by preeclampsia.  Doing well postpartum, follow up in 1 week for blood pressure check.

## 2019-07-03 ENCOUNTER — INPATIENT (INPATIENT)
Facility: HOSPITAL | Age: 39
LOS: 0 days | Discharge: HOME | DRG: 776 | End: 2019-07-04
Attending: OBSTETRICS & GYNECOLOGY | Admitting: OBSTETRICS & GYNECOLOGY
Payer: COMMERCIAL

## 2019-07-03 VITALS
HEIGHT: 64 IN | HEART RATE: 80 BPM | WEIGHT: 218.7 LBS | TEMPERATURE: 98 F | OXYGEN SATURATION: 96 % | SYSTOLIC BLOOD PRESSURE: 142 MMHG | RESPIRATION RATE: 18 BRPM | DIASTOLIC BLOOD PRESSURE: 90 MMHG

## 2019-07-03 LAB
ALBUMIN SERPL ELPH-MCNC: 3.4 G/DL — SIGNIFICANT CHANGE UP (ref 3.3–5)
ALBUMIN SERPL ELPH-MCNC: 3.6 G/DL — SIGNIFICANT CHANGE UP (ref 3.3–5)
ALP SERPL-CCNC: 199 U/L — HIGH (ref 40–120)
ALP SERPL-CCNC: 216 U/L — HIGH (ref 40–120)
ALT FLD-CCNC: 65 U/L — HIGH (ref 10–45)
ALT FLD-CCNC: 73 U/L — HIGH (ref 10–45)
ANION GAP SERPL CALC-SCNC: 12 MMOL/L — SIGNIFICANT CHANGE UP (ref 5–17)
ANION GAP SERPL CALC-SCNC: 14 MMOL/L — SIGNIFICANT CHANGE UP (ref 5–17)
APPEARANCE UR: CLEAR — SIGNIFICANT CHANGE UP
AST SERPL-CCNC: 40 U/L — SIGNIFICANT CHANGE UP (ref 10–40)
AST SERPL-CCNC: 50 U/L — HIGH (ref 10–40)
BACTERIA # UR AUTO: PRESENT /HPF
BASOPHILS # BLD AUTO: 0.02 K/UL — SIGNIFICANT CHANGE UP (ref 0–0.2)
BASOPHILS NFR BLD AUTO: 0.3 % — SIGNIFICANT CHANGE UP (ref 0–2)
BILIRUB SERPL-MCNC: 0.3 MG/DL — SIGNIFICANT CHANGE UP (ref 0.2–1.2)
BILIRUB SERPL-MCNC: 0.4 MG/DL — SIGNIFICANT CHANGE UP (ref 0.2–1.2)
BILIRUB UR-MCNC: NEGATIVE — SIGNIFICANT CHANGE UP
BUN SERPL-MCNC: 11 MG/DL — SIGNIFICANT CHANGE UP (ref 7–23)
BUN SERPL-MCNC: 12 MG/DL — SIGNIFICANT CHANGE UP (ref 7–23)
CALCIUM SERPL-MCNC: 8.6 MG/DL — SIGNIFICANT CHANGE UP (ref 8.4–10.5)
CALCIUM SERPL-MCNC: 9.3 MG/DL — SIGNIFICANT CHANGE UP (ref 8.4–10.5)
CHLORIDE SERPL-SCNC: 102 MMOL/L — SIGNIFICANT CHANGE UP (ref 96–108)
CHLORIDE SERPL-SCNC: 104 MMOL/L — SIGNIFICANT CHANGE UP (ref 96–108)
CO2 SERPL-SCNC: 22 MMOL/L — SIGNIFICANT CHANGE UP (ref 22–31)
CO2 SERPL-SCNC: 25 MMOL/L — SIGNIFICANT CHANGE UP (ref 22–31)
COLOR SPEC: YELLOW — SIGNIFICANT CHANGE UP
CREAT SERPL-MCNC: 0.48 MG/DL — LOW (ref 0.5–1.3)
CREAT SERPL-MCNC: 0.57 MG/DL — SIGNIFICANT CHANGE UP (ref 0.5–1.3)
DIFF PNL FLD: ABNORMAL
EOSINOPHIL # BLD AUTO: 0.2 K/UL — SIGNIFICANT CHANGE UP (ref 0–0.5)
EOSINOPHIL NFR BLD AUTO: 2.8 % — SIGNIFICANT CHANGE UP (ref 0–6)
EPI CELLS # UR: SIGNIFICANT CHANGE UP /HPF (ref 0–5)
GLUCOSE SERPL-MCNC: 103 MG/DL — HIGH (ref 70–99)
GLUCOSE SERPL-MCNC: 108 MG/DL — HIGH (ref 70–99)
GLUCOSE UR QL: NEGATIVE — SIGNIFICANT CHANGE UP
HCT VFR BLD CALC: 31.7 % — LOW (ref 34.5–45)
HCT VFR BLD CALC: 34.1 % — LOW (ref 34.5–45)
HGB BLD-MCNC: 10.2 G/DL — LOW (ref 11.5–15.5)
HGB BLD-MCNC: 10.7 G/DL — LOW (ref 11.5–15.5)
IMM GRANULOCYTES NFR BLD AUTO: 1.4 % — SIGNIFICANT CHANGE UP (ref 0–1.5)
KETONES UR-MCNC: NEGATIVE — SIGNIFICANT CHANGE UP
LDH SERPL L TO P-CCNC: 326 U/L — HIGH (ref 50–242)
LEUKOCYTE ESTERASE UR-ACNC: NEGATIVE — SIGNIFICANT CHANGE UP
LYMPHOCYTES # BLD AUTO: 1.44 K/UL — SIGNIFICANT CHANGE UP (ref 1–3.3)
LYMPHOCYTES # BLD AUTO: 20.4 % — SIGNIFICANT CHANGE UP (ref 13–44)
MAGNESIUM SERPL-MCNC: 4.2 MG/DL — HIGH (ref 1.6–2.6)
MCHC RBC-ENTMCNC: 29.2 PG — SIGNIFICANT CHANGE UP (ref 27–34)
MCHC RBC-ENTMCNC: 29.7 PG — SIGNIFICANT CHANGE UP (ref 27–34)
MCHC RBC-ENTMCNC: 31.4 GM/DL — LOW (ref 32–36)
MCHC RBC-ENTMCNC: 32.2 GM/DL — SIGNIFICANT CHANGE UP (ref 32–36)
MCV RBC AUTO: 92.4 FL — SIGNIFICANT CHANGE UP (ref 80–100)
MCV RBC AUTO: 92.9 FL — SIGNIFICANT CHANGE UP (ref 80–100)
MONOCYTES # BLD AUTO: 0.46 K/UL — SIGNIFICANT CHANGE UP (ref 0–0.9)
MONOCYTES NFR BLD AUTO: 6.5 % — SIGNIFICANT CHANGE UP (ref 2–14)
NEUTROPHILS # BLD AUTO: 4.85 K/UL — SIGNIFICANT CHANGE UP (ref 1.8–7.4)
NEUTROPHILS NFR BLD AUTO: 68.6 % — SIGNIFICANT CHANGE UP (ref 43–77)
NITRITE UR-MCNC: NEGATIVE — SIGNIFICANT CHANGE UP
NRBC # BLD: 0 /100 WBCS — SIGNIFICANT CHANGE UP (ref 0–0)
NRBC # BLD: 0 /100 WBCS — SIGNIFICANT CHANGE UP (ref 0–0)
PH UR: 6 — SIGNIFICANT CHANGE UP (ref 5–8)
PLATELET # BLD AUTO: 257 K/UL — SIGNIFICANT CHANGE UP (ref 150–400)
PLATELET # BLD AUTO: 266 K/UL — SIGNIFICANT CHANGE UP (ref 150–400)
POTASSIUM SERPL-MCNC: 3.7 MMOL/L — SIGNIFICANT CHANGE UP (ref 3.5–5.3)
POTASSIUM SERPL-MCNC: 4 MMOL/L — SIGNIFICANT CHANGE UP (ref 3.5–5.3)
POTASSIUM SERPL-SCNC: 3.7 MMOL/L — SIGNIFICANT CHANGE UP (ref 3.5–5.3)
POTASSIUM SERPL-SCNC: 4 MMOL/L — SIGNIFICANT CHANGE UP (ref 3.5–5.3)
PROT SERPL-MCNC: 6.7 G/DL — SIGNIFICANT CHANGE UP (ref 6–8.3)
PROT SERPL-MCNC: 6.7 G/DL — SIGNIFICANT CHANGE UP (ref 6–8.3)
PROT UR-MCNC: NEGATIVE MG/DL — SIGNIFICANT CHANGE UP
RBC # BLD: 3.43 M/UL — LOW (ref 3.8–5.2)
RBC # BLD: 3.67 M/UL — LOW (ref 3.8–5.2)
RBC # FLD: 14 % — SIGNIFICANT CHANGE UP (ref 10.3–14.5)
RBC # FLD: 14.1 % — SIGNIFICANT CHANGE UP (ref 10.3–14.5)
RBC CASTS # UR COMP ASSIST: < 5 /HPF — SIGNIFICANT CHANGE UP
SODIUM SERPL-SCNC: 139 MMOL/L — SIGNIFICANT CHANGE UP (ref 135–145)
SODIUM SERPL-SCNC: 140 MMOL/L — SIGNIFICANT CHANGE UP (ref 135–145)
SP GR SPEC: <=1.005 — SIGNIFICANT CHANGE UP (ref 1–1.03)
SURGICAL PATHOLOGY STUDY: SIGNIFICANT CHANGE UP
URATE SERPL-MCNC: 4.1 MG/DL — SIGNIFICANT CHANGE UP (ref 2.5–7)
UROBILINOGEN FLD QL: 0.2 E.U./DL — SIGNIFICANT CHANGE UP
WBC # BLD: 7.07 K/UL — SIGNIFICANT CHANGE UP (ref 3.8–10.5)
WBC # BLD: 7.89 K/UL — SIGNIFICANT CHANGE UP (ref 3.8–10.5)
WBC # FLD AUTO: 7.07 K/UL — SIGNIFICANT CHANGE UP (ref 3.8–10.5)
WBC # FLD AUTO: 7.89 K/UL — SIGNIFICANT CHANGE UP (ref 3.8–10.5)
WBC UR QL: < 5 /HPF — SIGNIFICANT CHANGE UP

## 2019-07-03 PROCEDURE — 93010 ELECTROCARDIOGRAM REPORT: CPT | Mod: NC

## 2019-07-03 PROCEDURE — 99285 EMERGENCY DEPT VISIT HI MDM: CPT

## 2019-07-03 RX ORDER — HEPARIN SODIUM 5000 [USP'U]/ML
5000 INJECTION INTRAVENOUS; SUBCUTANEOUS EVERY 12 HOURS
Refills: 0 | Status: DISCONTINUED | OUTPATIENT
Start: 2019-07-03 | End: 2019-07-04

## 2019-07-03 RX ORDER — AER TRAVELER 0.5 G/1
1 SOLUTION RECTAL; TOPICAL EVERY 4 HOURS
Refills: 0 | Status: DISCONTINUED | OUTPATIENT
Start: 2019-07-03 | End: 2019-07-04

## 2019-07-03 RX ORDER — HYDROCORTISONE 1 %
1 OINTMENT (GRAM) TOPICAL EVERY 6 HOURS
Refills: 0 | Status: DISCONTINUED | OUTPATIENT
Start: 2019-07-03 | End: 2019-07-04

## 2019-07-03 RX ORDER — OXYCODONE HYDROCHLORIDE 5 MG/1
5 TABLET ORAL EVERY 6 HOURS
Refills: 0 | Status: DISCONTINUED | OUTPATIENT
Start: 2019-07-03 | End: 2019-07-04

## 2019-07-03 RX ORDER — MAGNESIUM SULFATE 500 MG/ML
2 VIAL (ML) INJECTION
Qty: 20 | Refills: 0 | Status: DISCONTINUED | OUTPATIENT
Start: 2019-07-03 | End: 2019-07-03

## 2019-07-03 RX ORDER — OXYCODONE HYDROCHLORIDE 5 MG/1
5 TABLET ORAL
Refills: 0 | Status: DISCONTINUED | OUTPATIENT
Start: 2019-07-03 | End: 2019-07-03

## 2019-07-03 RX ORDER — MAGNESIUM SULFATE 500 MG/ML
2 VIAL (ML) INJECTION
Qty: 40 | Refills: 0 | Status: DISCONTINUED | OUTPATIENT
Start: 2019-07-03 | End: 2019-07-04

## 2019-07-03 RX ORDER — BENZOCAINE AND MENTHOL 5; 1 G/100ML; G/100ML
1 LIQUID ORAL
Refills: 0 | Status: DISCONTINUED | OUTPATIENT
Start: 2019-07-03 | End: 2019-07-04

## 2019-07-03 RX ORDER — SODIUM CHLORIDE 9 MG/ML
3 INJECTION INTRAMUSCULAR; INTRAVENOUS; SUBCUTANEOUS EVERY 8 HOURS
Refills: 0 | Status: DISCONTINUED | OUTPATIENT
Start: 2019-07-03 | End: 2019-07-04

## 2019-07-03 RX ORDER — ACETAMINOPHEN 500 MG
650 TABLET ORAL EVERY 6 HOURS
Refills: 0 | Status: DISCONTINUED | OUTPATIENT
Start: 2019-07-03 | End: 2019-07-03

## 2019-07-03 RX ORDER — DOCUSATE SODIUM 100 MG
100 CAPSULE ORAL
Refills: 0 | Status: DISCONTINUED | OUTPATIENT
Start: 2019-07-03 | End: 2019-07-04

## 2019-07-03 RX ORDER — BENZOCAINE 10 %
1 GEL (GRAM) MUCOUS MEMBRANE EVERY 6 HOURS
Refills: 0 | Status: DISCONTINUED | OUTPATIENT
Start: 2019-07-03 | End: 2019-07-04

## 2019-07-03 RX ORDER — DIPHENHYDRAMINE HCL 50 MG
25 CAPSULE ORAL EVERY 6 HOURS
Refills: 0 | Status: DISCONTINUED | OUTPATIENT
Start: 2019-07-03 | End: 2019-07-04

## 2019-07-03 RX ORDER — ACETAMINOPHEN 500 MG
1000 TABLET ORAL ONCE
Refills: 0 | Status: COMPLETED | OUTPATIENT
Start: 2019-07-03 | End: 2019-07-03

## 2019-07-03 RX ORDER — SODIUM CHLORIDE 9 MG/ML
3 INJECTION INTRAMUSCULAR; INTRAVENOUS; SUBCUTANEOUS ONCE
Refills: 0 | Status: COMPLETED | OUTPATIENT
Start: 2019-07-03 | End: 2019-07-03

## 2019-07-03 RX ORDER — MAGNESIUM HYDROXIDE 400 MG/1
30 TABLET, CHEWABLE ORAL
Refills: 0 | Status: DISCONTINUED | OUTPATIENT
Start: 2019-07-03 | End: 2019-07-04

## 2019-07-03 RX ORDER — ACETAMINOPHEN 500 MG
975 TABLET ORAL
Refills: 0 | Status: DISCONTINUED | OUTPATIENT
Start: 2019-07-03 | End: 2019-07-04

## 2019-07-03 RX ORDER — IBUPROFEN 200 MG
600 TABLET ORAL EVERY 6 HOURS
Refills: 0 | Status: DISCONTINUED | OUTPATIENT
Start: 2019-07-03 | End: 2019-07-04

## 2019-07-03 RX ORDER — OXYTOCIN 10 UNIT/ML
333.33 VIAL (ML) INJECTION
Qty: 20 | Refills: 0 | Status: DISCONTINUED | OUTPATIENT
Start: 2019-07-03 | End: 2019-07-03

## 2019-07-03 RX ORDER — PRAMOXINE HYDROCHLORIDE 150 MG/15G
1 AEROSOL, FOAM RECTAL EVERY 4 HOURS
Refills: 0 | Status: DISCONTINUED | OUTPATIENT
Start: 2019-07-03 | End: 2019-07-04

## 2019-07-03 RX ORDER — LANOLIN
1 OINTMENT (GRAM) TOPICAL EVERY 6 HOURS
Refills: 0 | Status: DISCONTINUED | OUTPATIENT
Start: 2019-07-03 | End: 2019-07-04

## 2019-07-03 RX ORDER — GLYCERIN ADULT
1 SUPPOSITORY, RECTAL RECTAL AT BEDTIME
Refills: 0 | Status: DISCONTINUED | OUTPATIENT
Start: 2019-07-03 | End: 2019-07-04

## 2019-07-03 RX ORDER — OXYMETAZOLINE HYDROCHLORIDE 0.5 MG/ML
2 SPRAY NASAL EVERY 12 HOURS
Refills: 0 | Status: DISCONTINUED | OUTPATIENT
Start: 2019-07-03 | End: 2019-07-03

## 2019-07-03 RX ORDER — FLUTICASONE PROPIONATE 50 MCG
1 SPRAY, SUSPENSION NASAL
Refills: 0 | Status: DISCONTINUED | OUTPATIENT
Start: 2019-07-03 | End: 2019-07-04

## 2019-07-03 RX ORDER — ZOLPIDEM TARTRATE 10 MG/1
5 TABLET ORAL AT BEDTIME
Refills: 0 | Status: DISCONTINUED | OUTPATIENT
Start: 2019-07-03 | End: 2019-07-04

## 2019-07-03 RX ORDER — TETANUS TOXOID, REDUCED DIPHTHERIA TOXOID AND ACELLULAR PERTUSSIS VACCINE, ADSORBED 5; 2.5; 8; 8; 2.5 [IU]/.5ML; [IU]/.5ML; UG/.5ML; UG/.5ML; UG/.5ML
0.5 SUSPENSION INTRAMUSCULAR ONCE
Refills: 0 | Status: DISCONTINUED | OUTPATIENT
Start: 2019-07-03 | End: 2019-07-04

## 2019-07-03 RX ORDER — OXYCODONE HYDROCHLORIDE 5 MG/1
5 TABLET ORAL ONCE
Refills: 0 | Status: DISCONTINUED | OUTPATIENT
Start: 2019-07-03 | End: 2019-07-04

## 2019-07-03 RX ORDER — SODIUM CHLORIDE 9 MG/ML
1000 INJECTION, SOLUTION INTRAVENOUS
Refills: 0 | Status: DISCONTINUED | OUTPATIENT
Start: 2019-07-03 | End: 2019-07-04

## 2019-07-03 RX ORDER — SODIUM CHLORIDE 0.65 %
1 AEROSOL, SPRAY (ML) NASAL
Refills: 0 | Status: DISCONTINUED | OUTPATIENT
Start: 2019-07-03 | End: 2019-07-04

## 2019-07-03 RX ORDER — MAGNESIUM SULFATE 500 MG/ML
4 VIAL (ML) INJECTION ONCE
Refills: 0 | Status: COMPLETED | OUTPATIENT
Start: 2019-07-03 | End: 2019-07-03

## 2019-07-03 RX ORDER — LORATADINE 10 MG/1
10 TABLET ORAL DAILY
Refills: 0 | Status: DISCONTINUED | OUTPATIENT
Start: 2019-07-03 | End: 2019-07-04

## 2019-07-03 RX ORDER — DIBUCAINE 1 %
1 OINTMENT (GRAM) RECTAL EVERY 6 HOURS
Refills: 0 | Status: DISCONTINUED | OUTPATIENT
Start: 2019-07-03 | End: 2019-07-04

## 2019-07-03 RX ORDER — SIMETHICONE 80 MG/1
80 TABLET, CHEWABLE ORAL EVERY 4 HOURS
Refills: 0 | Status: DISCONTINUED | OUTPATIENT
Start: 2019-07-03 | End: 2019-07-04

## 2019-07-03 RX ADMIN — Medication 975 MILLIGRAM(S): at 19:55

## 2019-07-03 RX ADMIN — Medication 1 CAPSULE(S): at 13:41

## 2019-07-03 RX ADMIN — SIMETHICONE 80 MILLIGRAM(S): 80 TABLET, CHEWABLE ORAL at 23:30

## 2019-07-03 RX ADMIN — Medication 1 SPRAY(S): at 16:15

## 2019-07-03 RX ADMIN — SODIUM CHLORIDE 75 MILLILITER(S): 9 INJECTION, SOLUTION INTRAVENOUS at 23:07

## 2019-07-03 RX ADMIN — ZOLPIDEM TARTRATE 5 MILLIGRAM(S): 10 TABLET ORAL at 23:30

## 2019-07-03 RX ADMIN — Medication 600 MILLIGRAM(S): at 23:30

## 2019-07-03 RX ADMIN — SODIUM CHLORIDE 3 MILLILITER(S): 9 INJECTION INTRAMUSCULAR; INTRAVENOUS; SUBCUTANEOUS at 12:49

## 2019-07-03 RX ADMIN — Medication 50 GM/HR: at 14:50

## 2019-07-03 RX ADMIN — Medication 100 GRAM(S): at 14:02

## 2019-07-03 RX ADMIN — Medication 50 GM/HR: at 23:07

## 2019-07-03 RX ADMIN — Medication 600 MILLIGRAM(S): at 17:16

## 2019-07-03 RX ADMIN — SODIUM CHLORIDE 3 MILLILITER(S): 9 INJECTION INTRAMUSCULAR; INTRAVENOUS; SUBCUTANEOUS at 22:56

## 2019-07-03 RX ADMIN — Medication 1 CAPSULE(S): at 16:30

## 2019-07-03 RX ADMIN — Medication 975 MILLIGRAM(S): at 20:50

## 2019-07-03 RX ADMIN — OXYCODONE HYDROCHLORIDE 5 MILLIGRAM(S): 5 TABLET ORAL at 17:15

## 2019-07-03 NOTE — H&P ADULT - ASSESSMENT
A/P: 39 yo G P s/p c/s on 6/27 POD#5 with hx of preeclampsia with SF's prior to deilvery now presenting with persistent headache  -HA likely due to preeclampsia with severe features, pt has persistent HA not resolved with ibuprofen/oxycodone, now with transaminitis more than double    -admit to Labor and delivery  -magnesium IV for 24 hours, clinical and serum magnesium checks q6 hours   -BPs well controlled now in mild range without antihypertensives, no need for medication, consider adding meds if BPs increase to 150s   -consider neurology consult if HA not resolved after magnesium  -regular diet   -Is and Os   -motrin, tylenol, and oxycodone for pain control     D/w Dr. Retana

## 2019-07-03 NOTE — ED ADULT NURSE NOTE - NSIMPLEMENTINTERV_GEN_ALL_ED
Implemented All Universal Safety Interventions:  [place] to call system. Call bell, personal items and telephone within reach. Instruct patient to call for assistance. Room bathroom lighting operational. Non-slip footwear when patient is off stretcher. Physically safe environment: no spills, clutter or unnecessary equipment. Stretcher in lowest position, wheels locked, appropriate side rails in place.

## 2019-07-03 NOTE — ED PROVIDER NOTE - CARE PLAN
Principal Discharge DX:	Preeclampsia in postpartum period  Secondary Diagnosis:	Liver enzyme elevation

## 2019-07-03 NOTE — H&P ADULT - NSHPPHYSICALEXAM_GEN_ALL_CORE
Gen: NAD   Resp: CTAB  CV: RRR  Abd: soft, nontender, non distended, well healing pfannensteil incision with steri strips in place  ext: no LE edema, no calf tenderness   neuro: vision wnl, peripheral vision in tact

## 2019-07-03 NOTE — ED ADULT TRIAGE NOTE - CHIEF COMPLAINT QUOTE
patient c/o headache with slight blurry vision  since 2 am today , had C section 06/28/19 . Was sent by GYN for r/o pre eclampsia .

## 2019-07-03 NOTE — ED PROVIDER NOTE - CLINICAL SUMMARY MEDICAL DECISION MAKING FREE TEXT BOX
post partum preeclampsia  admit  mag 4 g  then 2 g/ hr  to LDR per Maggi REHMAN 39 yo F with post partum preeclampsi  EDC 35 weeks- c section-  admit  mag 4 g  then 2 g/ hr  to LDR per Maggi REHMAN

## 2019-07-03 NOTE — PROGRESS NOTE ADULT - ASSESSMENT
A&P:   38y  s/p  section on POD#6, readmitted with headache and elevated LFTs, thus preeclampsia with severe features. Headache now resolved and BP controlled.       1. Preeclampsia: Continue IV Magnesium @2G/hr for 24 hrs   Blurry vision likely from magnesium, no toxic complaints at this time  Antihypertensives: no medications needed at this time   Continue to monitor blood pressures  Next Magnesium check @ 3am  Follow up on Magnesium serum level and full labs at this time     2. GI: tolerating regular diet     3. : strict Is and Os

## 2019-07-03 NOTE — PROGRESS NOTE ADULT - ASSESSMENT
A&P: 38y Female   s/p CS POD  complicated by preeclampsia with severe features  1. Preeclampsia: Continue IV Magnesium @2G/hr for 24 hrs post delivery. Magnesium clinical checks and serum assay q6 hrs. No complaints currently. BP well controlled on motrin, oxycodone  2. VTE: SCDs, SQH if indicated.   3. GI: Diet Regular diet  4. Neuro: Oral pain medications as needed  5. : strict Is and Os, D/C fraire after discontinuation of IV Magnesium  6. Discharge planning: discussed importance of BP check at home, will be evaluated by PP nursing re need for VNS. Discussed close follow up with OB within 1-2 wks. Reviewed toxic complaints with patient.

## 2019-07-03 NOTE — ED PROVIDER NOTE - OBJECTIVE STATEMENT
39 yo F  had  at 35 weeks, 2 days ago now with frontal headache since 2 am today- no weakness of upper or lower ext  no parasthesias or tingling- (-awoke with it) no N/V ? blurry vision a few hours ago that was transient and resolved-  no abd pain or N/V no fevers - incision non tender- no assoc pain-

## 2019-07-03 NOTE — H&P ADULT - NSHPLABSRESULTS_GEN_ALL_CORE
10.7   7.07  )-----------( 257      ( 03 Jul 2019 13:07 )             34.1       07-03    140  |  104  |  11  ----------------------------<  108<H>  4.0   |  22  |  0.48<L>    Ca    9.3      03 Jul 2019 13:07    TPro  6.7  /  Alb  3.4  /  TBili  0.4  /  DBili  x   /  AST  50<H>  /  ALT  73<H>  /  AlkPhos  216<H>  07-03      Urinalysis Basic - ( 03 Jul 2019 15:14 )    Color: Yellow / Appearance: Clear / SG: <=1.005 / pH: x  Gluc: x / Ketone: NEGATIVE  / Bili: Negative / Urobili: 0.2 E.U./dL   Blood: x / Protein: NEGATIVE mg/dL / Nitrite: NEGATIVE   Leuk Esterase: NEGATIVE / RBC: < 5 /HPF / WBC < 5 /HPF   Sq Epi: x / Non Sq Epi: 0-5 /HPF / Bacteria: Present /HPF

## 2019-07-03 NOTE — ED ADULT NURSE NOTE - OBJECTIVE STATEMENT
Pt presents to ED c/o HA. Pt  s/p  delivery on  dc'd home presents today with HA. Pt reports HA onset shortly after waking up this am, currently 7/10, with associated brief blurry vision episode also this morning PTA. Pt with Hx pre-eclampsia prior to delivery, no Hx HTN. Pt denies dizziness, CP/SOB, abdominal pain, N/V. Pt with c/s scar to lower abdomen, well approximated no drainage. Pt presents in NAD speaking full sentences ambulatory through triage. EKG preformed in triage. Pt upgraded to MD Murguia on arrival to bed 17 and connected to continuous cardiac monitor and pulse ox. Suction at bedside.

## 2019-07-03 NOTE — H&P ADULT - HISTORY OF PRESENT ILLNESS
37 yo G P s/p c/s on 6/27 POD#5 with hx of preeclampsia with SF's prior to deilvery now presenting with headache. Was at her child's pediatrician today and had her BP taken because she was worried, BP was in the 130s/90s, and had a severe headache all morning so was told to come in by her OB. Was delivered at 37 weeks via c/s for preeclampsia, never had elevated blood pressures in pregnancy but had headache with RUQ pain. Once she was delivered she didn't have a HA anymore. Was on IV Mg from 6/27-6/29. Reports HA today is 5/10, HA in the front of her head, denies scotoma. Reports blurry vision, pointed to a sign 20 feet away and said she could read it but that "it took a second longer than normal to focus."  Denies nausea, vomiting, abdominal pain, le edema, calf tenderness.    POBHx: g1-g3: VTOP x2, MAB x1  g4: c/s 2017 urgent for NRFHT   g5: c/s repeat, for PEC with SF's   PGyn: colpo 2014, normal paps since, denies leep/cone, denies fibroids/cysts   PMH denies  PSH c/s x1  NKDA  Meds PNV

## 2019-07-04 ENCOUNTER — TRANSCRIPTION ENCOUNTER (OUTPATIENT)
Age: 39
End: 2019-07-04

## 2019-07-04 VITALS
RESPIRATION RATE: 18 BRPM | DIASTOLIC BLOOD PRESSURE: 79 MMHG | HEART RATE: 72 BPM | OXYGEN SATURATION: 99 % | TEMPERATURE: 98 F | SYSTOLIC BLOOD PRESSURE: 125 MMHG

## 2019-07-04 DIAGNOSIS — O34.211 MATERNAL CARE FOR LOW TRANSVERSE SCAR FROM PREVIOUS CESAREAN DELIVERY: ICD-10-CM

## 2019-07-04 DIAGNOSIS — Z3A.37 37 WEEKS GESTATION OF PREGNANCY: ICD-10-CM

## 2019-07-04 LAB
ALBUMIN SERPL ELPH-MCNC: 3.2 G/DL — LOW (ref 3.3–5)
ALBUMIN SERPL ELPH-MCNC: 3.5 G/DL — SIGNIFICANT CHANGE UP (ref 3.3–5)
ALP SERPL-CCNC: 179 U/L — HIGH (ref 40–120)
ALP SERPL-CCNC: 197 U/L — HIGH (ref 40–120)
ALT FLD-CCNC: 58 U/L — HIGH (ref 10–45)
ALT FLD-CCNC: 63 U/L — HIGH (ref 10–45)
ANION GAP SERPL CALC-SCNC: 12 MMOL/L — SIGNIFICANT CHANGE UP (ref 5–17)
ANION GAP SERPL CALC-SCNC: 12 MMOL/L — SIGNIFICANT CHANGE UP (ref 5–17)
AST SERPL-CCNC: 36 U/L — SIGNIFICANT CHANGE UP (ref 10–40)
AST SERPL-CCNC: 36 U/L — SIGNIFICANT CHANGE UP (ref 10–40)
BILIRUB SERPL-MCNC: 0.2 MG/DL — SIGNIFICANT CHANGE UP (ref 0.2–1.2)
BILIRUB SERPL-MCNC: 0.3 MG/DL — SIGNIFICANT CHANGE UP (ref 0.2–1.2)
BUN SERPL-MCNC: 11 MG/DL — SIGNIFICANT CHANGE UP (ref 7–23)
BUN SERPL-MCNC: 8 MG/DL — SIGNIFICANT CHANGE UP (ref 7–23)
CALCIUM SERPL-MCNC: 8 MG/DL — LOW (ref 8.4–10.5)
CALCIUM SERPL-MCNC: 8.2 MG/DL — LOW (ref 8.4–10.5)
CHLORIDE SERPL-SCNC: 102 MMOL/L — SIGNIFICANT CHANGE UP (ref 96–108)
CHLORIDE SERPL-SCNC: 103 MMOL/L — SIGNIFICANT CHANGE UP (ref 96–108)
CO2 SERPL-SCNC: 24 MMOL/L — SIGNIFICANT CHANGE UP (ref 22–31)
CO2 SERPL-SCNC: 26 MMOL/L — SIGNIFICANT CHANGE UP (ref 22–31)
CREAT SERPL-MCNC: 0.47 MG/DL — LOW (ref 0.5–1.3)
CREAT SERPL-MCNC: 0.52 MG/DL — SIGNIFICANT CHANGE UP (ref 0.5–1.3)
CULTURE RESULTS: SIGNIFICANT CHANGE UP
GLUCOSE SERPL-MCNC: 101 MG/DL — HIGH (ref 70–99)
GLUCOSE SERPL-MCNC: 99 MG/DL — SIGNIFICANT CHANGE UP (ref 70–99)
HCT VFR BLD CALC: 35 % — SIGNIFICANT CHANGE UP (ref 34.5–45)
HGB BLD-MCNC: 11 G/DL — LOW (ref 11.5–15.5)
MAGNESIUM SERPL-MCNC: 4.2 MG/DL — HIGH (ref 1.6–2.6)
MAGNESIUM SERPL-MCNC: 5.4 MG/DL — HIGH (ref 1.6–2.6)
MCHC RBC-ENTMCNC: 29.3 PG — SIGNIFICANT CHANGE UP (ref 27–34)
MCHC RBC-ENTMCNC: 31.4 GM/DL — LOW (ref 32–36)
MCV RBC AUTO: 93.1 FL — SIGNIFICANT CHANGE UP (ref 80–100)
NRBC # BLD: 0 /100 WBCS — SIGNIFICANT CHANGE UP (ref 0–0)
PLATELET # BLD AUTO: 265 K/UL — SIGNIFICANT CHANGE UP (ref 150–400)
POTASSIUM SERPL-MCNC: 3.6 MMOL/L — SIGNIFICANT CHANGE UP (ref 3.5–5.3)
POTASSIUM SERPL-MCNC: 3.7 MMOL/L — SIGNIFICANT CHANGE UP (ref 3.5–5.3)
POTASSIUM SERPL-SCNC: 3.6 MMOL/L — SIGNIFICANT CHANGE UP (ref 3.5–5.3)
POTASSIUM SERPL-SCNC: 3.7 MMOL/L — SIGNIFICANT CHANGE UP (ref 3.5–5.3)
PROT SERPL-MCNC: 6.1 G/DL — SIGNIFICANT CHANGE UP (ref 6–8.3)
PROT SERPL-MCNC: 6.8 G/DL — SIGNIFICANT CHANGE UP (ref 6–8.3)
RBC # BLD: 3.76 M/UL — LOW (ref 3.8–5.2)
RBC # FLD: 14.2 % — SIGNIFICANT CHANGE UP (ref 10.3–14.5)
SODIUM SERPL-SCNC: 138 MMOL/L — SIGNIFICANT CHANGE UP (ref 135–145)
SODIUM SERPL-SCNC: 141 MMOL/L — SIGNIFICANT CHANGE UP (ref 135–145)
SPECIMEN SOURCE: SIGNIFICANT CHANGE UP
WBC # BLD: 6.6 K/UL — SIGNIFICANT CHANGE UP (ref 3.8–10.5)
WBC # FLD AUTO: 6.6 K/UL — SIGNIFICANT CHANGE UP (ref 3.8–10.5)

## 2019-07-04 PROCEDURE — 83615 LACTATE (LD) (LDH) ENZYME: CPT

## 2019-07-04 PROCEDURE — 81001 URINALYSIS AUTO W/SCOPE: CPT

## 2019-07-04 PROCEDURE — 94640 AIRWAY INHALATION TREATMENT: CPT

## 2019-07-04 PROCEDURE — 93005 ELECTROCARDIOGRAM TRACING: CPT

## 2019-07-04 PROCEDURE — 84550 ASSAY OF BLOOD/URIC ACID: CPT

## 2019-07-04 PROCEDURE — 96374 THER/PROPH/DIAG INJ IV PUSH: CPT

## 2019-07-04 PROCEDURE — 99285 EMERGENCY DEPT VISIT HI MDM: CPT | Mod: 25

## 2019-07-04 PROCEDURE — 87086 URINE CULTURE/COLONY COUNT: CPT

## 2019-07-04 PROCEDURE — 80053 COMPREHEN METABOLIC PANEL: CPT

## 2019-07-04 PROCEDURE — 85027 COMPLETE CBC AUTOMATED: CPT

## 2019-07-04 PROCEDURE — 96376 TX/PRO/DX INJ SAME DRUG ADON: CPT

## 2019-07-04 PROCEDURE — 83735 ASSAY OF MAGNESIUM: CPT

## 2019-07-04 PROCEDURE — 85025 COMPLETE CBC W/AUTO DIFF WBC: CPT

## 2019-07-04 PROCEDURE — 36415 COLL VENOUS BLD VENIPUNCTURE: CPT

## 2019-07-04 RX ORDER — MAGNESIUM SULFATE 500 MG/ML
2.5 VIAL (ML) INJECTION
Qty: 40 | Refills: 0 | Status: DISCONTINUED | OUTPATIENT
Start: 2019-07-04 | End: 2019-07-04

## 2019-07-04 RX ADMIN — Medication 975 MILLIGRAM(S): at 03:00

## 2019-07-04 RX ADMIN — OXYCODONE HYDROCHLORIDE 5 MILLIGRAM(S): 5 TABLET ORAL at 18:05

## 2019-07-04 RX ADMIN — Medication 600 MILLIGRAM(S): at 12:16

## 2019-07-04 RX ADMIN — Medication 600 MILLIGRAM(S): at 07:06

## 2019-07-04 RX ADMIN — Medication 1 TABLET(S): at 12:17

## 2019-07-04 RX ADMIN — Medication 600 MILLIGRAM(S): at 13:15

## 2019-07-04 RX ADMIN — Medication 1 SPRAY(S): at 07:09

## 2019-07-04 RX ADMIN — Medication 600 MILLIGRAM(S): at 07:33

## 2019-07-04 RX ADMIN — SODIUM CHLORIDE 3 MILLILITER(S): 9 INJECTION INTRAMUSCULAR; INTRAVENOUS; SUBCUTANEOUS at 14:07

## 2019-07-04 RX ADMIN — OXYCODONE HYDROCHLORIDE 5 MILLIGRAM(S): 5 TABLET ORAL at 05:02

## 2019-07-04 RX ADMIN — Medication 975 MILLIGRAM(S): at 02:12

## 2019-07-04 RX ADMIN — Medication 100 MILLIGRAM(S): at 12:17

## 2019-07-04 RX ADMIN — SIMETHICONE 80 MILLIGRAM(S): 80 TABLET, CHEWABLE ORAL at 07:06

## 2019-07-04 RX ADMIN — HEPARIN SODIUM 5000 UNIT(S): 5000 INJECTION INTRAVENOUS; SUBCUTANEOUS at 07:06

## 2019-07-04 RX ADMIN — OXYCODONE HYDROCHLORIDE 5 MILLIGRAM(S): 5 TABLET ORAL at 17:06

## 2019-07-04 RX ADMIN — Medication 600 MILLIGRAM(S): at 00:00

## 2019-07-04 RX ADMIN — OXYCODONE HYDROCHLORIDE 5 MILLIGRAM(S): 5 TABLET ORAL at 05:30

## 2019-07-04 RX ADMIN — SODIUM CHLORIDE 3 MILLILITER(S): 9 INJECTION INTRAMUSCULAR; INTRAVENOUS; SUBCUTANEOUS at 07:09

## 2019-07-04 NOTE — DISCHARGE NOTE OB - CARE PLAN
Principal Discharge DX:	Preeclampsia in postpartum period  Goal:	Normal blood pressures, feeling well, asymptomatic  Assessment and plan of treatment:	Preeclampsia with severe features in postpartum period.  Administered magnesium for 24 hours.  Patient is now normotensive and asymptomatic.  Secondary Diagnosis:	Liver enzyme elevation

## 2019-07-04 NOTE — PROGRESS NOTE ADULT - ATTENDING COMMENTS
Patient seen and examined at bedside. Agree with above evaluation. Plan for discharge after magnesium if patient is asymptomatic and labs continue to trend to normal

## 2019-07-04 NOTE — CHART NOTE - NSCHARTNOTEFT_GEN_A_CORE
Patient seen at bedside for mg check. No blurry vision, vision changes, headaches, shortness of breath, RUQ pain.    PE: Gen: no acute distress sitting up in bed  CV: RRR, no MRG  Pulm: CTAB, symmetrical chest rise  Abd:  nontender, nondistended  Ext: swelling bilaterally unchanged    Patient would like to go home after mg is finished. Patient seen at bedside for mg check. No blurry vision, vision changes, headaches, shortness of breath, RUQ pain.    PE: Gen: no acute distress sitting up in bed  CV: RRR, no MRG  Pulm: CTAB, symmetrical chest rise  Abd:  nontender, nondistended  Ext: swelling bilaterally unchanged  Neuro: 2+ reflexes bilaterally    Plan for magnesium to be discontinued at 2pm. Will continue to monitor patient through the evening with possible discharge.

## 2019-07-04 NOTE — DISCHARGE NOTE OB - PLAN OF CARE
Normal blood pressures, feeling well, asymptomatic Preeclampsia with severe features in postpartum period.  Administered magnesium for 24 hours.  Patient is now normotensive and asymptomatic.

## 2019-07-04 NOTE — DISCHARGE NOTE OB - PATIENT PORTAL LINK FT
You can access the [facility] Patient Portal, offered by [facility], by registering with the following website: [email or web address]

## 2019-07-04 NOTE — PROGRESS NOTE ADULT - ASSESSMENT
38y POD8 after C/S complicated by preeclampsia with severe features (HA, LFTs)  Readmission HD#3.       1. Preeclampsia: Continue IV Magnesium, increase to 2.5G/hr given Mg serum level at 4.2     No complaints currently.   Antihypertensives: no medications at this time given normotensive   Continue to monitor blood pressures  Next Magnesium check @ 9:00 am, with full labs  Follow up on Magnesium serum level     2. GI: tolerating regular diet   3. : strict Is and Os.  4. Dispo pending how patient feels after magnesium 38y POD8 after C/S complicated by preeclampsia with severe features (HA, LFTs)  Readmission HD#2.       1. Preeclampsia: Continue IV Magnesium, increase to 2.5G/hr given Mg serum level at 4.2     No complaints currently.   Antihypertensives: no medications at this time given normotensive   Continue to monitor blood pressures  Next Magnesium check @ 9:00 am, with full labs  Follow up on Magnesium serum level     2. GI: tolerating regular diet   3. : strict Is and Os.  4. Dispo pending how patient feels after magnesium

## 2019-07-04 NOTE — PROGRESS NOTE ADULT - REASON FOR ADMISSION
preeclampsia with severe features

## 2019-07-04 NOTE — DISCHARGE NOTE OB - CARE PROVIDER_API CALL
Meena Acevedo (DO; MS)  [facility] Obstetrics and Gynecology  [address] [place] [address]  Phone: [phone]  Fax: [phone]  Follow Up Time:

## 2019-07-04 NOTE — PROGRESS NOTE ADULT - SUBJECTIVE AND OBJECTIVE BOX
Patient evaluated at bedside for clinical magnesium check. Serum magnesium was drawn at 3:00am.  She denies visual disturbances including scotoma, headache and right upper quadrant pain. Also denies nausea/vomiting/epigastric pain/shortness of breath. Pain well controlled.      T(C): 36.6 (07-04-19 @ 02:00), Max: 36.8 (07-03-19 @ 18:00)  HR: 79 (07-04-19 @ 02:00) (70 - 90)  BP: 104/69 (07-04-19 @ 02:00) (104/69 - 131/73)  RR: 19 (07-04-19 @ 02:00) (17 - 19)  SpO2: 95% (07-04-19 @ 02:00) (95% - 99%)  Wt(kg): --  Daily Height in cm: 162.56 (03 Jul 2019 14:45)    Daily     07-03 @ 07:01  -  07-04 @ 04:42  --------------------------------------------------------  IN: 1500 mL / OUT: 3000 mL / NET: -1500 mL      Gen: NAD, AAOx3  CV: RRR, no M/R/G  Pulm: CTAB, no R/R/W  Abd: soft, nontender, no rebound or guarding, no epigastric tenderness, liver nonpalpable +BS, fundus palpated   : No fraire since this is a readmission. Collects urine.  Ext: +1 edema carter, SCDs in place, Reflexes +1                          10.2   7.89  )-----------( 266      ( 03 Jul 2019 22:00 )             31.7     07-03    139  |  102  |  12  ----------------------------<  103<H>  3.7   |  25  |  0.57    Ca    8.6      03 Jul 2019 22:00  Mg     4.2     07-04    TPro  6.7  /  Alb  3.6  /  TBili  0.3  /  DBili  x   /  AST  40  /  ALT  65<H>  /  AlkPhos  199<H>  07-03    acetaminophen   Tablet .. 975 milliGRAM(s) Oral <User Schedule> PRN  benzocaine 15 mG/menthol 3.6 mG Lozenge 1 Lozenge Oral four times a day PRN  benzocaine 20%/menthol 0.5% Spray 1 Spray(s) Topical every 6 hours PRN  dibucaine 1% Ointment 1 Application(s) Topical every 6 hours PRN  diphenhydrAMINE 25 milliGRAM(s) Oral every 6 hours PRN  diphtheria/tetanus/pertussis (acellular) Vaccine (ADAcel) 0.5 milliLiter(s) IntraMuscular once  docusate sodium 100 milliGRAM(s) Oral two times a day PRN  fluticasone propionate 50 MICROgram(s)/spray Nasal Spray 1 Spray(s) Both Nostrils two times a day  glycerin Suppository - Adult 1 Suppository(s) Rectal at bedtime PRN  heparin  Injectable 5000 Unit(s) SubCutaneous every 12 hours  hydrocortisone 1% Cream 1 Application(s) Topical every 6 hours PRN  ibuprofen  Tablet. 600 milliGRAM(s) Oral every 6 hours PRN  ibuprofen  Tablet. 600 milliGRAM(s) Oral every 6 hours  lactated ringers. 1000 milliLiter(s) IV Continuous <Continuous>  lanolin Ointment 1 Application(s) Topical every 6 hours PRN  loratadine 10 milliGRAM(s) Oral daily  magnesium hydroxide Suspension 30 milliLiter(s) Oral two times a day PRN  magnesium sulfate Infusion 2.5 Gm/Hr IV Continuous <Continuous>  oxyCODONE    IR 5 milliGRAM(s) Oral once PRN  oxyCODONE    IR 5 milliGRAM(s) Oral every 6 hours PRN  pramoxine 1%/zinc 5% Cream 1 Application(s) Topical every 4 hours PRN  prenatal multivitamin 1 Tablet(s) Oral daily  simethicone 80 milliGRAM(s) Chew every 4 hours PRN  sodium chloride 0.65% Nasal 1 Spray(s) Both Nostrils two times a day PRN  sodium chloride 0.9% lock flush 3 milliLiter(s) IV Push every 8 hours  witch hazel Pads 1 Application(s) Topical every 4 hours PRN  zolpidem 5 milliGRAM(s) Oral at bedtime PRN      07-03-19 @ 07:01  -  07-04-19 @ 04:42  --------------------------------------------------------  IN: 1500 mL / OUT: 3000 mL / NET: -1500 mL
S: Pt evaluated at bedside. Patient admitted due to headache and elevated liver enzymes. She denies visual disturbances, and right upper quadrant pain. Also denies nausea/vomiting/epigastric pain/shortness of breath. Pain controlled s/p CS with motrin and oxycodone.  Patient is upset due to being readmitted and is concerned about bonding with the baby.     O:  T(C): 36.4 (07-03-19 @ 14:45), Max: 36.8 (07-03-19 @ 12:31)  HR: 78 (07-03-19 @ 17:00) (62 - 82)  BP: 131/73 (07-03-19 @ 17:00) (106/54 - 142/90)  RR: 18 (07-03-19 @ 17:00) (16 - 18)  SpO2: 99% (07-03-19 @ 17:00) (96% - 100%)  Wt(kg): --  Daily Height in cm: 162.56 (03 Jul 2019 14:45)    Daily     07-03 @ 07:01  -  07-03 @ 17:08  --------------------------------------------------------  IN: 125 mL / OUT: 900 mL / NET: -775 mL      Gen: NAD, AAOx3  Abd: soft, nontender, no rebound or guarding, no epigastric tenderness, liver nonpalpable +BS.   : voiding freely    acetaminophen   Tablet .. 975 milliGRAM(s) Oral <User Schedule> PRN  benzocaine 15 mG/menthol 3.6 mG Lozenge 1 Lozenge Oral four times a day PRN  benzocaine 20%/menthol 0.5% Spray 1 Spray(s) Topical every 6 hours PRN  dibucaine 1% Ointment 1 Application(s) Topical every 6 hours PRN  diphenhydrAMINE 25 milliGRAM(s) Oral every 6 hours PRN  diphtheria/tetanus/pertussis (acellular) Vaccine (ADAcel) 0.5 milliLiter(s) IntraMuscular once  docusate sodium 100 milliGRAM(s) Oral two times a day PRN  fluticasone propionate 50 MICROgram(s)/spray Nasal Spray 1 Spray(s) Both Nostrils two times a day  glycerin Suppository - Adult 1 Suppository(s) Rectal at bedtime PRN  hydrocortisone 1% Cream 1 Application(s) Topical every 6 hours PRN  ibuprofen  Tablet. 600 milliGRAM(s) Oral every 6 hours PRN  ibuprofen  Tablet. 600 milliGRAM(s) Oral every 6 hours  lactated ringers. 1000 milliLiter(s) IV Continuous <Continuous>  lanolin Ointment 1 Application(s) Topical every 6 hours PRN  loratadine 10 milliGRAM(s) Oral daily  magnesium hydroxide Suspension 30 milliLiter(s) Oral two times a day PRN  magnesium sulfate Infusion 2 Gm/Hr IV Continuous <Continuous>  oxyCODONE    IR 5 milliGRAM(s) Oral once PRN  oxyCODONE    IR 5 milliGRAM(s) Oral every 6 hours PRN  pramoxine 1%/zinc 5% Cream 1 Application(s) Topical every 4 hours PRN  prenatal multivitamin 1 Tablet(s) Oral daily  simethicone 80 milliGRAM(s) Chew every 4 hours PRN  sodium chloride 0.65% Nasal 1 Spray(s) Both Nostrils two times a day PRN  sodium chloride 0.9% lock flush 3 milliLiter(s) IV Push every 8 hours  witch hazel Pads 1 Application(s) Topical every 4 hours PRN    No Known Allergies                            10.7   7.07  )-----------( 257      ( 03 Jul 2019 13:07 )             34.1     07-03    140  |  104  |  11  ----------------------------<  108<H>  4.0   |  22  |  0.48<L>    Ca    9.3      03 Jul 2019 13:07    TPro  6.7  /  Alb  3.4  /  TBili  0.4  /  DBili  x   /  AST  50<H>  /  ALT  73<H>  /  AlkPhos  216<H>  07-03
Patient evaluated at bedside for clinical magnesium check. Serum magnesium to be drawn at this time.  She denies scotoma, headache and right upper quadrant pain. Also denies nausea/vomiting/epigastric pain/shortness of breath. Reports mild blurry vision since starting Magnesium. Pain well controlled.      T(C): 36.8 (07-03-19 @ 18:00), Max: 36.8 (07-03-19 @ 12:31)  HR: 70 (07-03-19 @ 18:00) (62 - 82)  BP: 128/77 (07-03-19 @ 18:00) (106/54 - 142/90)  RR: 18 (07-03-19 @ 18:00) (16 - 18)  SpO2: 97% (07-03-19 @ 18:00) (96% - 100%)  Wt(kg): --  Daily Height in cm: 162.56 (03 Jul 2019 14:45)    Daily     07-03 @ 07:01  -  07-03 @ 21:45  --------------------------------------------------------  IN: 375 mL / OUT: 2250 mL / NET: -1875 mL      Gen: NAD, AAOx3  CV: RRR, no M/R/G  Pulm: CTAB, no R/R/W  Abd: soft, appropriately tender, no rebound or guarding, no epigastric tenderness, liver nonpalpable +BS, fundus palpated   Ext: +1 edema carter, SCDs in place, Reflexes 1+ bilaterally                           10.7   7.07  )-----------( 257      ( 03 Jul 2019 13:07 )             34.1     07-03    140  |  104  |  11  ----------------------------<  108<H>  4.0   |  22  |  0.48<L>    Ca    9.3      03 Jul 2019 13:07    TPro  6.7  /  Alb  3.4  /  TBili  0.4  /  DBili  x   /  AST  50<H>  /  ALT  73<H>  /  AlkPhos  216<H>  07-03    acetaminophen   Tablet .. 975 milliGRAM(s) Oral <User Schedule> PRN  benzocaine 15 mG/menthol 3.6 mG Lozenge 1 Lozenge Oral four times a day PRN  benzocaine 20%/menthol 0.5% Spray 1 Spray(s) Topical every 6 hours PRN  dibucaine 1% Ointment 1 Application(s) Topical every 6 hours PRN  diphenhydrAMINE 25 milliGRAM(s) Oral every 6 hours PRN  diphtheria/tetanus/pertussis (acellular) Vaccine (ADAcel) 0.5 milliLiter(s) IntraMuscular once  docusate sodium 100 milliGRAM(s) Oral two times a day PRN  fluticasone propionate 50 MICROgram(s)/spray Nasal Spray 1 Spray(s) Both Nostrils two times a day  glycerin Suppository - Adult 1 Suppository(s) Rectal at bedtime PRN  hydrocortisone 1% Cream 1 Application(s) Topical every 6 hours PRN  ibuprofen  Tablet. 600 milliGRAM(s) Oral every 6 hours PRN  ibuprofen  Tablet. 600 milliGRAM(s) Oral every 6 hours  lactated ringers. 1000 milliLiter(s) IV Continuous <Continuous>  lanolin Ointment 1 Application(s) Topical every 6 hours PRN  loratadine 10 milliGRAM(s) Oral daily  magnesium hydroxide Suspension 30 milliLiter(s) Oral two times a day PRN  magnesium sulfate Infusion 2 Gm/Hr IV Continuous <Continuous>  oxyCODONE    IR 5 milliGRAM(s) Oral once PRN  oxyCODONE    IR 5 milliGRAM(s) Oral every 6 hours PRN  pramoxine 1%/zinc 5% Cream 1 Application(s) Topical every 4 hours PRN  prenatal multivitamin 1 Tablet(s) Oral daily  simethicone 80 milliGRAM(s) Chew every 4 hours PRN  sodium chloride 0.65% Nasal 1 Spray(s) Both Nostrils two times a day PRN  sodium chloride 0.9% lock flush 3 milliLiter(s) IV Push every 8 hours  witch hazel Pads 1 Application(s) Topical every 4 hours PRN  zolpidem 5 milliGRAM(s) Oral at bedtime PRN      07-03-19 @ 07:01  -  07-03-19 @ 21:45  --------------------------------------------------------  IN: 375 mL / OUT: 2250 mL / NET: -1875 mL
Patient evaluated at bedside.   She reports pain is well controlled with OPM. Patient reports improvement this morning of her headache, still continues to endorse slight blurry vision, no N/V, RUQ pain.    She has been ambulating with assistance, voiding spontaneously, passing gas, tolerating regular diet and is breastfeeding.    She denies HA, dizziness, CP, palpitations, SOB, n/v, or heavy vaginal bleeding.    Physical Exam:  Vital Signs Last 24 Hrs  T(C): 36.7 (04 Jul 2019 06:46), Max: 36.8 (03 Jul 2019 12:31)  T(F): 98.1 (04 Jul 2019 06:46), Max: 98.3 (03 Jul 2019 12:31)  HR: 71 (04 Jul 2019 06:46) (62 - 90)  BP: 109/72 (04 Jul 2019 06:46) (104/63 - 142/90)  BP(mean): 82 (03 Jul 2019 17:00) (65 - 96)  RR: 18 (04 Jul 2019 06:46) (16 - 19)  SpO2: 95% (04 Jul 2019 06:46) (95% - 100%)    Gen: NAD  Abd: + BS, soft, nontender, nondistended, no rebound or guarding  Incision clean, dry and intact  uterus firm at midline  : lochia WNL  Extremities: no swelling or calf tenderness, reflexes +1                          10.2   7.89  )-----------( 266      ( 03 Jul 2019 22:00 )             31.7     07-04    138  |  102  |  11  ----------------------------<  99  3.6   |  24  |  0.52    Ca    8.2<L>      04 Jul 2019 02:37  Mg     4.2     07-04    TPro  6.1  /  Alb  3.2<L>  /  TBili  0.2  /  DBili  x   /  AST  36  /  ALT  58<H>  /  AlkPhos  179<H>  07-04    Magnesium, Serum: 4.2 mg/dL (07-04 @ 02:37)  Magnesium, Serum: 4.2 mg/dL (07-03 @ 22:00)      MEDICATIONS  (STANDING):  diphtheria/tetanus/pertussis (acellular) Vaccine (ADAcel) 0.5 milliLiter(s) IntraMuscular once  fluticasone propionate 50 MICROgram(s)/spray Nasal Spray 1 Spray(s) Both Nostrils two times a day  heparin  Injectable 5000 Unit(s) SubCutaneous every 12 hours  ibuprofen  Tablet. 600 milliGRAM(s) Oral every 6 hours  lactated ringers. 1000 milliLiter(s) (75 mL/Hr) IV Continuous <Continuous>  loratadine 10 milliGRAM(s) Oral daily  magnesium sulfate Infusion 2.5 Gm/Hr (62.5 mL/Hr) IV Continuous <Continuous>  prenatal multivitamin 1 Tablet(s) Oral daily  sodium chloride 0.9% lock flush 3 milliLiter(s) IV Push every 8 hours    MEDICATIONS  (PRN):  acetaminophen   Tablet .. 975 milliGRAM(s) Oral <User Schedule> PRN Moderate Pain (4 - 6)  benzocaine 15 mG/menthol 3.6 mG Lozenge 1 Lozenge Oral four times a day PRN Sore Throat  benzocaine 20%/menthol 0.5% Spray 1 Spray(s) Topical every 6 hours PRN for Perineal discomfort  dibucaine 1% Ointment 1 Application(s) Topical every 6 hours PRN Perineal discomfort  diphenhydrAMINE 25 milliGRAM(s) Oral every 6 hours PRN Pruritus  docusate sodium 100 milliGRAM(s) Oral two times a day PRN For stool softening  glycerin Suppository - Adult 1 Suppository(s) Rectal at bedtime PRN Constipation  hydrocortisone 1% Cream 1 Application(s) Topical every 6 hours PRN Moderate Pain (4-6)  ibuprofen  Tablet. 600 milliGRAM(s) Oral every 6 hours PRN Mild Pain (1 - 3)  lanolin Ointment 1 Application(s) Topical every 6 hours PRN nipple soreness  magnesium hydroxide Suspension 30 milliLiter(s) Oral two times a day PRN Constipation  oxyCODONE    IR 5 milliGRAM(s) Oral once PRN Moderate to Severe Pain (4-10)  oxyCODONE    IR 5 milliGRAM(s) Oral every 6 hours PRN Severe Pain (7 - 10)  pramoxine 1%/zinc 5% Cream 1 Application(s) Topical every 4 hours PRN Moderate Pain (4-6)  simethicone 80 milliGRAM(s) Chew every 4 hours PRN Gas  sodium chloride 0.65% Nasal 1 Spray(s) Both Nostrils two times a day PRN Nasal Congestion  witch hazel Pads 1 Application(s) Topical every 4 hours PRN Perineal discomfort  zolpidem 5 milliGRAM(s) Oral at bedtime PRN Insomnia
Patient seen and examined at bedside. Patient is now off magnesium. Reports headache is gone. Lab work is stable. If patient remains stable and blood pressures are wnl, plan for discharge this evening with follow up in office on Monday.

## 2019-07-04 NOTE — PROGRESS NOTE ADULT - ASSESSMENT
A&P:   38y POD6 after C/S complicated by preeclampsia with severe features (HA, LFTs)  Readmission HD#2.       1. Preeclampsia: Continue IV Magnesium @2.5G/hr.  No complaints currently.   Antihypertensives:   Continue to monitor blood pressures  Next Magnesium check @ 9:00 am  Follow up on Magnesium serum level     2. GI: Clears, until Magnesium is stopped    3. : strict Is and Os. A&P:   38y POD7 after C/S complicated by preeclampsia with severe features (HA, LFTs)  Readmission HD#2.       1. Preeclampsia: Continue IV Magnesium, increase to 2.5G/hr given Mg serum level at 4.2     No complaints currently.   Antihypertensives: no medications at this time given normotensive   Continue to monitor blood pressures  Next Magnesium check @ 9:00 am, with full labs  Follow up on Magnesium serum level     2. GI: tolerating regular diet   3. : strict Is and Os.

## 2019-07-04 NOTE — DISCHARGE NOTE OB - HOSPITAL COURSE
Patient was readmitted for preeclampsia with severe features of headaches and elevated liver function tests in postpartum period.  She had a  on .  Administered magnesium for 24 hours.  Patient is now normotensive with no headaches and normal labs.

## 2019-07-08 DIAGNOSIS — O99.89 OTHER SPECIFIED DISEASES AND CONDITIONS COMPLICATING PREGNANCY, CHILDBIRTH AND THE PUERPERIUM: ICD-10-CM

## 2019-07-08 DIAGNOSIS — R79.89 OTHER SPECIFIED ABNORMAL FINDINGS OF BLOOD CHEMISTRY: ICD-10-CM

## 2019-11-26 NOTE — PROGRESS NOTE ADULT - SUBJECTIVE AND OBJECTIVE BOX
Pt. seen at 1115  Pain Management Progress Note - [facility] [phone]    HPI:  Pt. states percocet is helping better now that she is taking it more frequently.              Pertinent PMH: Pain at: ___Back ___Neck___Knee ___Hip ___Shoulder ___ Opioid tolerance Abd    Pain is __x_ sharp ____dull ___burning ___achy ___ Intensity: ____ mild ____mod ____severe     Location __x___surgical site _____cervical _____lumbar _x__abd _____upper ext____lower ext    Worse with __x__activity __x__movement _____physical therapy___ Rest    Improved with __x__medication _x___rest ____physical therapy    oxytocin Infusion  famotidine    Tablet  oxytocin Infusion  morphine PF Epidural  naloxone Injectable  ondansetron Injectable  lactated ringers.  acetaminophen   Tablet  oxyCODONE  5 mG/acetaminophen 325 mG  simethicone  diphenhydrAMINE   Capsule  diphtheria/tetanus/pertussis (acellular) Vaccine (ADAcel)  oxytocin Infusion  glycerin Suppository - Adult  docusate sodium  lanolin Ointment  ferrous    sulfate  prenatal multivitamin  heparin  Injectable  levothyroxine  acetaminophen  IVPB.  ketorolac   Injectable  zolpidem  oxyCODONE  5 mG/acetaminophen 325 mG      ROS: Const:  ___febrile   Eyes:___ENT:___CV: ___chest pain  Resp: ____sob  GI:_-__nausea _-__vomiting __+__abd pain ___npo ___clears _+__full diet __bm  :___ Musk: ___pain ___spasm  Skin:___ Neuro:  __-_kuspldid_-__izoqfsrip____ numbness ___weakness ___paresthesia  Psych:___anxiety  Endo:___ Heme:___Allergy:___                T(C): 36.8, Max: 36.8 (- @ 10:33)  HR: 79 (62 - 79)  BP: 90/60 (90/60 - 123/77)  RR: 19 (18 - 19)  SpO2: 98% (97% - 98%)  Wt(kg): --     PHYSICAL EXAM:  Gen Appearance: _x__no acute distress _x__appropriate         Neuro: _x__SILT feet_x__ EOM Intact Psych: AAOX__3, __x_mood/affect appropriate        Eyes: _x__conjunctiva WNL  ___x__ Pupils equal and round        ENT: __x_ears and nose atraumatic_x__ Hearing grossly intact        Neck: ___trachea midline, no visible masses ___thyroid without palpable mass    Resp: _x_Nml WOB____No tactile fremitus ___clear to auscultation    Cardio: ___extremities free from edema ____pedal pulses palpable    GI/Abdomen: ___soft ___x__ tender______Nondistended_____HSM    Lymphatic: ___no palpable nodes in neck  ___no palpable nodes calves and feet    Skin/Wound: ___Incision, _x__Dressing c/d/i,   ____surrounding tissues soft,  ___drain/chest tube present____    Muscular: EHL ___/5  Gastrocnemius___/5    _5__absent clubbing/cyanosis         ASSESSMENT:  This is a 36y old Female with a history of:  PREGNANCY  Normal postpartum course  abdominal pain S/P  and is doing better today.      Recommended Treatment PLAN:  1.  Percocet 5/325 1-2 tab po q4h prn  2.  Toradol 30 mg IV q6h no

## 2020-01-12 ENCOUNTER — EMERGENCY (EMERGENCY)
Facility: HOSPITAL | Age: 40
LOS: 1 days | Discharge: HOME | End: 2020-01-12
Attending: EMERGENCY MEDICINE | Admitting: EMERGENCY MEDICINE
Payer: COMMERCIAL

## 2020-01-12 VITALS
RESPIRATION RATE: 20 BRPM | HEART RATE: 72 BPM | OXYGEN SATURATION: 98 % | SYSTOLIC BLOOD PRESSURE: 112 MMHG | DIASTOLIC BLOOD PRESSURE: 77 MMHG

## 2020-01-12 VITALS
OXYGEN SATURATION: 98 % | TEMPERATURE: 98 F | RESPIRATION RATE: 18 BRPM | DIASTOLIC BLOOD PRESSURE: 74 MMHG | SYSTOLIC BLOOD PRESSURE: 116 MMHG | WEIGHT: 149.91 LBS | HEART RATE: 76 BPM

## 2020-01-12 LAB
ALBUMIN SERPL ELPH-MCNC: 4.1 G/DL — SIGNIFICANT CHANGE UP (ref 3.3–5)
ALP SERPL-CCNC: 58 U/L — SIGNIFICANT CHANGE UP (ref 40–120)
ALT FLD-CCNC: 11 U/L — SIGNIFICANT CHANGE UP (ref 10–45)
ANION GAP SERPL CALC-SCNC: 12 MMOL/L — SIGNIFICANT CHANGE UP (ref 5–17)
AST SERPL-CCNC: 21 U/L — SIGNIFICANT CHANGE UP (ref 10–40)
BASOPHILS # BLD AUTO: 0.05 K/UL — SIGNIFICANT CHANGE UP (ref 0–0.2)
BASOPHILS NFR BLD AUTO: 1.1 % — SIGNIFICANT CHANGE UP (ref 0–2)
BILIRUB SERPL-MCNC: 0.4 MG/DL — SIGNIFICANT CHANGE UP (ref 0.2–1.2)
BUN SERPL-MCNC: 13 MG/DL — SIGNIFICANT CHANGE UP (ref 7–23)
CALCIUM SERPL-MCNC: 9.4 MG/DL — SIGNIFICANT CHANGE UP (ref 8.4–10.5)
CHLORIDE SERPL-SCNC: 105 MMOL/L — SIGNIFICANT CHANGE UP (ref 96–108)
CO2 SERPL-SCNC: 24 MMOL/L — SIGNIFICANT CHANGE UP (ref 22–31)
CREAT SERPL-MCNC: 0.47 MG/DL — LOW (ref 0.5–1.3)
EOSINOPHIL # BLD AUTO: 0.08 K/UL — SIGNIFICANT CHANGE UP (ref 0–0.5)
EOSINOPHIL NFR BLD AUTO: 1.8 % — SIGNIFICANT CHANGE UP (ref 0–6)
GLUCOSE SERPL-MCNC: 95 MG/DL — SIGNIFICANT CHANGE UP (ref 70–99)
HCT VFR BLD CALC: 33.1 % — LOW (ref 34.5–45)
HGB BLD-MCNC: 10.6 G/DL — LOW (ref 11.5–15.5)
IMM GRANULOCYTES NFR BLD AUTO: 0.2 % — SIGNIFICANT CHANGE UP (ref 0–1.5)
LYMPHOCYTES # BLD AUTO: 1.94 K/UL — SIGNIFICANT CHANGE UP (ref 1–3.3)
LYMPHOCYTES # BLD AUTO: 44 % — SIGNIFICANT CHANGE UP (ref 13–44)
MCHC RBC-ENTMCNC: 27.2 PG — SIGNIFICANT CHANGE UP (ref 27–34)
MCHC RBC-ENTMCNC: 32 GM/DL — SIGNIFICANT CHANGE UP (ref 32–36)
MCV RBC AUTO: 85.1 FL — SIGNIFICANT CHANGE UP (ref 80–100)
MONOCYTES # BLD AUTO: 0.4 K/UL — SIGNIFICANT CHANGE UP (ref 0–0.9)
MONOCYTES NFR BLD AUTO: 9.1 % — SIGNIFICANT CHANGE UP (ref 2–14)
NEUTROPHILS # BLD AUTO: 1.93 K/UL — SIGNIFICANT CHANGE UP (ref 1.8–7.4)
NEUTROPHILS NFR BLD AUTO: 43.8 % — SIGNIFICANT CHANGE UP (ref 43–77)
NRBC # BLD: 0 /100 WBCS — SIGNIFICANT CHANGE UP (ref 0–0)
PLATELET # BLD AUTO: 251 K/UL — SIGNIFICANT CHANGE UP (ref 150–400)
POTASSIUM SERPL-MCNC: 4.2 MMOL/L — SIGNIFICANT CHANGE UP (ref 3.5–5.3)
POTASSIUM SERPL-SCNC: 4.2 MMOL/L — SIGNIFICANT CHANGE UP (ref 3.5–5.3)
PROT SERPL-MCNC: 6.8 G/DL — SIGNIFICANT CHANGE UP (ref 6–8.3)
RBC # BLD: 3.89 M/UL — SIGNIFICANT CHANGE UP (ref 3.8–5.2)
RBC # FLD: 14.9 % — HIGH (ref 10.3–14.5)
SODIUM SERPL-SCNC: 141 MMOL/L — SIGNIFICANT CHANGE UP (ref 135–145)
WBC # BLD: 4.41 K/UL — SIGNIFICANT CHANGE UP (ref 3.8–10.5)
WBC # FLD AUTO: 4.41 K/UL — SIGNIFICANT CHANGE UP (ref 3.8–10.5)

## 2020-01-12 PROCEDURE — 76856 US EXAM PELVIC COMPLETE: CPT

## 2020-01-12 PROCEDURE — 76856 US EXAM PELVIC COMPLETE: CPT | Mod: 26

## 2020-01-12 PROCEDURE — 36415 COLL VENOUS BLD VENIPUNCTURE: CPT

## 2020-01-12 PROCEDURE — 99284 EMERGENCY DEPT VISIT MOD MDM: CPT | Mod: 25

## 2020-01-12 PROCEDURE — 99284 EMERGENCY DEPT VISIT MOD MDM: CPT

## 2020-01-12 PROCEDURE — 76830 TRANSVAGINAL US NON-OB: CPT | Mod: 26

## 2020-01-12 PROCEDURE — 85025 COMPLETE CBC W/AUTO DIFF WBC: CPT

## 2020-01-12 PROCEDURE — 80053 COMPREHEN METABOLIC PANEL: CPT

## 2020-01-12 PROCEDURE — 76830 TRANSVAGINAL US NON-OB: CPT

## 2020-01-12 RX ORDER — LIRAGLUTIDE 6 MG/ML
0 INJECTION SUBCUTANEOUS
Qty: 0 | Refills: 0 | DISCHARGE

## 2020-01-12 RX ORDER — SODIUM CHLORIDE 9 MG/ML
1000 INJECTION INTRAMUSCULAR; INTRAVENOUS; SUBCUTANEOUS ONCE
Refills: 0 | Status: COMPLETED | OUTPATIENT
Start: 2020-01-12 | End: 2020-01-12

## 2020-01-12 RX ORDER — CEPHALEXIN 500 MG
1 CAPSULE ORAL
Qty: 40 | Refills: 0
Start: 2020-01-12 | End: 2020-01-21

## 2020-01-12 RX ORDER — DEXTROAMPHETAMINE SACCHARATE, AMPHETAMINE ASPARTATE, DEXTROAMPHETAMINE SULFATE AND AMPHETAMINE SULFATE 1.875; 1.875; 1.875; 1.875 MG/1; MG/1; MG/1; MG/1
0 TABLET ORAL
Qty: 0 | Refills: 0 | DISCHARGE

## 2020-01-12 RX ADMIN — SODIUM CHLORIDE 1000 MILLILITER(S): 9 INJECTION INTRAMUSCULAR; INTRAVENOUS; SUBCUTANEOUS at 16:45

## 2020-01-12 NOTE — CONSULT NOTE ADULT - ASSESSMENT
37 yo  s/p c/s on  with hx of preeclampsia in last delivery; presenting for 1 day of vaginal bleeding  -hemodynamically stable  -ordered TVUS, will f/u results     D/w Dr. Morin 39 yo  s/p c/s on  with hx of preeclampsia in last delivery; presenting for 1 day of vaginal bleeding  -hemodynamically stable, hgb 10.6  -TVUS uterus wnl, no retained POC, endometrium 0.8 cm wnl   -has ovarian cyst 5.8cm, pt asymptomatic   -no need for acute gyn intervention at this time  -will discharge with TXA for 5 days to lessen acute vaginal bleeding, schedule outpatient for diagnostic laparoscopy and hysteroscopy, possible IUD placement   -sent home with precautions to return if bleeding increases, more than 2 pads in 2 hours, fainting, dizziness     D/w Dr. Morin

## 2020-01-12 NOTE — ED PROVIDER NOTE - PHYSICAL EXAMINATION
no LE edema, normal equal distal pulses.  pelvic exam deferred to OBGYN resident  Able to stand w/o any additional lightheaded/dizziness

## 2020-01-12 NOTE — ED PROVIDER NOTE - NSFOLLOWUPINSTRUCTIONS_ED_ALL_ED_FT
Can take tylenol 650mg every 6hrs as needed for pain.  Stay well hydrated.  Return for fevers, persistent vomit, uncontrolled pain, worsening breathing, worsening lightheaded, worsening bleeding (over 2 pads an hour for several hours) or symptoms of blood loss (worsening lightheaded, fatigue, trouble breathing).  Follow up with primary doctor within 1-2 days.   Follow up with GYN within 2-4 days.     Menorrhagia    Menorrhagia is a condition in which menstrual periods are heavy or last longer than normal. With menorrhagia, most periods a woman has may cause enough blood loss and cramping that she becomes unable to take part in her usual activities.    What are the causes?  Common causes of this condition include:  Noncancerous growths in the uterus (polyps or fibroids).  An imbalance of the estrogen and progesterone hormones.  One of the ovaries not releasing an egg during one or more months.  A problem with the thyroid gland (hypothyroid).  Side effects of having an intrauterine device (IUD).  Side effects of some medicines, such as anti-inflammatory medicines or blood thinners.  A bleeding disorder that stops the blood from clotting normally.  In some cases, the cause of this condition is not known.    What are the signs or symptoms?  Symptoms of this condition include:  Routinely having to change your pad or tampon every 1–2 hours because it is completely soaked.  Needing to use pads and tampons at the same time because of heavy bleeding.  Needing to wake up to change your pads or tampons during the night.  Passing blood clots larger than 1 inch (2.5 cm) in size.  Having bleeding that lasts for more than 7 days.  Having symptoms of low iron levels (anemia), such as tiredness, fatigue, or shortness of breath.    How is this diagnosed?  This condition may be diagnosed based on:  A physical exam.  Your symptoms and menstrual history.  Tests, such as:  Blood tests to check if you are pregnant or have hormonal changes, a bleeding or thyroid disorder, anemia, or other problems.  Pap test to check for cancerous changes, infections, or inflammation.  Endometrial biopsy. This test involves removing a tissue sample from the lining of the uterus (endometrium) to be examined under a microscope.  Pelvic ultrasound. This test uses sound waves to create images of your uterus, ovaries, and vagina. The images can show if you have fibroids or other growths.  Hysteroscopy. For this test, a small telescope is used to look inside your uterus.    How is this treated?  Treatment may not be needed for this condition. If it is needed, the best treatment for you will depend on:  Whether you need to prevent pregnancy.  Your desire to have children in the future.  The cause and severity of your bleeding.  Your personal preference.  Medicines are the first step in treatment. You may be treated with:  Hormonal birth control methods. These treatments reduce bleeding during your menstrual period. They include:  Birth control pills.  Skin patch.  Vaginal ring.  Shots (injections) that you get every 3 months.  Hormonal IUD (intrauterine device).  Implants that go under the skin.  Medicines that thicken blood and slow bleeding.  Medicines that reduce swelling, such as ibuprofen.  Medicines that contain an artificial (synthetic) hormone called progestin.  Medicines that make the ovaries stop working for a short time.  Iron supplements to treat anemia.    If medicines do not work, surgery may be done. Surgical options may include:  Dilation and curettage (D&C). In this procedure, your health care provider opens (dilates) your cervix and then scrapes or suctions tissue from the endometrium to reduce menstrual bleeding.  Operative hysteroscopy. In this procedure, a small tube with a light on the end (hysteroscope) is used to view your uterus and help remove polyps that may be causing heavy periods.  Endometrial ablation. This is when various techniques are used to permanently destroy your entire endometrium. After endometrial ablation, most women have little or no menstrual flow. This procedure reduces your ability to become pregnant.  Endometrial resection. In this procedure, an electrosurgical wire loop is used to remove the endometrium. This procedure reduces your ability to become pregnant.  Hysterectomy. This is surgical removal of the uterus. This is a permanent procedure that stops menstrual periods. Pregnancy is not possible after a hysterectomy.    Follow these instructions at home:  Medicines   Take over-the-counter and prescription medicines exactly as told by your health care provider. This includes iron pills.  Do not change or switch medicines without asking your health care provider.  Do not take aspirin or medicines that contain aspirin 1 week before or during your menstrual period. Aspirin may make bleeding worse.    General instructions   If you need to change your sanitary pad or tampon more than once every 2 hours, limit your activity until the bleeding stops.  Iron pills can cause constipation. To prevent or treat constipation while you are taking prescription iron supplements, your health care provider may recommend that you:  Drink enough fluid to keep your urine clear or pale yellow.  Take over-the-counter or prescription medicines.  Eat foods that are high in fiber, such as fresh fruits and vegetables, whole grains, and beans.  Limit foods that are high in fat and processed sugars, such as fried and sweet foods.  Eat well-balanced meals, including foods that are high in iron. Foods that have a lot of iron include leafy green vegetables, meat, liver, eggs, and whole grain breads and cereals.  Do not try to lose weight until the abnormal bleeding has stopped and your blood iron level is back to normal. If you need to lose weight, work with your health care provider to lose weight safely.  Keep all follow-up visits as told by your health care provider. This is important.    Contact a health care provider if:  You soak through a pad or tampon every 1 or 2 hours, and this happens every time you have a period.  You need to use pads and tampons at the same time because you are bleeding so much.  You have nausea, vomiting, diarrhea, or other problems related to medicines you are taking.  Get help right away if:  You soak through more than a pad or tampon in 1 hour.  You pass clots bigger than 1 inch (2.5 cm) wide.  You feel short of breath.  You feel like your heart is beating too fast.  You feel dizzy or faint.  You feel very weak or tired.    Summary  Menorrhagia is a condition in which menstrual periods are heavy or last longer than normal.  Treatment will depend on the cause of the condition and may include medicines or procedures.  Take over-the-counter and prescription medicines exactly as told by your health care provider. This includes iron pills.  Get help right away if you have heavy bleeding that soaks through more than a pad or tampon in 1 hour, you are passing large clots, or you feel dizzy, faint or short of breath. Can take tylenol 650mg every 6hrs as needed for pain.  Stay well hydrated.  Return for fevers, persistent vomit, uncontrolled pain, worsening breathing, worsening lightheaded, worsening bleeding (over 2 pads an hour for several hours) or symptoms of blood loss (worsening lightheaded, fatigue, trouble breathing).  Follow up with primary doctor within 1-2 days.   Follow up with Dr. Mikel smith or tomorrow to schedule further outpatient diagnostic testing and treatment.     Menorrhagia    Menorrhagia is a condition in which menstrual periods are heavy or last longer than normal. With menorrhagia, most periods a woman has may cause enough blood loss and cramping that she becomes unable to take part in her usual activities.    What are the causes?  Common causes of this condition include:  Noncancerous growths in the uterus (polyps or fibroids).  An imbalance of the estrogen and progesterone hormones.  One of the ovaries not releasing an egg during one or more months.  A problem with the thyroid gland (hypothyroid).  Side effects of having an intrauterine device (IUD).  Side effects of some medicines, such as anti-inflammatory medicines or blood thinners.  A bleeding disorder that stops the blood from clotting normally.  In some cases, the cause of this condition is not known.    What are the signs or symptoms?  Symptoms of this condition include:  Routinely having to change your pad or tampon every 1–2 hours because it is completely soaked.  Needing to use pads and tampons at the same time because of heavy bleeding.  Needing to wake up to change your pads or tampons during the night.  Passing blood clots larger than 1 inch (2.5 cm) in size.  Having bleeding that lasts for more than 7 days.  Having symptoms of low iron levels (anemia), such as tiredness, fatigue, or shortness of breath.    How is this diagnosed?  This condition may be diagnosed based on:  A physical exam.  Your symptoms and menstrual history.  Tests, such as:  Blood tests to check if you are pregnant or have hormonal changes, a bleeding or thyroid disorder, anemia, or other problems.  Pap test to check for cancerous changes, infections, or inflammation.  Endometrial biopsy. This test involves removing a tissue sample from the lining of the uterus (endometrium) to be examined under a microscope.  Pelvic ultrasound. This test uses sound waves to create images of your uterus, ovaries, and vagina. The images can show if you have fibroids or other growths.  Hysteroscopy. For this test, a small telescope is used to look inside your uterus.    How is this treated?  Treatment may not be needed for this condition. If it is needed, the best treatment for you will depend on:  Whether you need to prevent pregnancy.  Your desire to have children in the future.  The cause and severity of your bleeding.  Your personal preference.  Medicines are the first step in treatment. You may be treated with:  Hormonal birth control methods. These treatments reduce bleeding during your menstrual period. They include:  Birth control pills.  Skin patch.  Vaginal ring.  Shots (injections) that you get every 3 months.  Hormonal IUD (intrauterine device).  Implants that go under the skin.  Medicines that thicken blood and slow bleeding.  Medicines that reduce swelling, such as ibuprofen.  Medicines that contain an artificial (synthetic) hormone called progestin.  Medicines that make the ovaries stop working for a short time.  Iron supplements to treat anemia.    If medicines do not work, surgery may be done. Surgical options may include:  Dilation and curettage (D&C). In this procedure, your health care provider opens (dilates) your cervix and then scrapes or suctions tissue from the endometrium to reduce menstrual bleeding.  Operative hysteroscopy. In this procedure, a small tube with a light on the end (hysteroscope) is used to view your uterus and help remove polyps that may be causing heavy periods.  Endometrial ablation. This is when various techniques are used to permanently destroy your entire endometrium. After endometrial ablation, most women have little or no menstrual flow. This procedure reduces your ability to become pregnant.  Endometrial resection. In this procedure, an electrosurgical wire loop is used to remove the endometrium. This procedure reduces your ability to become pregnant.  Hysterectomy. This is surgical removal of the uterus. This is a permanent procedure that stops menstrual periods. Pregnancy is not possible after a hysterectomy.    Follow these instructions at home:  Medicines   Take over-the-counter and prescription medicines exactly as told by your health care provider. This includes iron pills.  Do not change or switch medicines without asking your health care provider.  Do not take aspirin or medicines that contain aspirin 1 week before or during your menstrual period. Aspirin may make bleeding worse.    General instructions   If you need to change your sanitary pad or tampon more than once every 2 hours, limit your activity until the bleeding stops.  Iron pills can cause constipation. To prevent or treat constipation while you are taking prescription iron supplements, your health care provider may recommend that you:  Drink enough fluid to keep your urine clear or pale yellow.  Take over-the-counter or prescription medicines.  Eat foods that are high in fiber, such as fresh fruits and vegetables, whole grains, and beans.  Limit foods that are high in fat and processed sugars, such as fried and sweet foods.  Eat well-balanced meals, including foods that are high in iron. Foods that have a lot of iron include leafy green vegetables, meat, liver, eggs, and whole grain breads and cereals.  Do not try to lose weight until the abnormal bleeding has stopped and your blood iron level is back to normal. If you need to lose weight, work with your health care provider to lose weight safely.  Keep all follow-up visits as told by your health care provider. This is important.    Contact a health care provider if:  You soak through a pad or tampon every 1 or 2 hours, and this happens every time you have a period.  You need to use pads and tampons at the same time because you are bleeding so much.  You have nausea, vomiting, diarrhea, or other problems related to medicines you are taking.  Get help right away if:  You soak through more than a pad or tampon in 1 hour.  You pass clots bigger than 1 inch (2.5 cm) wide.  You feel short of breath.  You feel like your heart is beating too fast.  You feel dizzy or faint.  You feel very weak or tired.    Summary  Menorrhagia is a condition in which menstrual periods are heavy or last longer than normal.  Treatment will depend on the cause of the condition and may include medicines or procedures.  Take over-the-counter and prescription medicines exactly as told by your health care provider. This includes iron pills.  Get help right away if you have heavy bleeding that soaks through more than a pad or tampon in 1 hour, you are passing large clots, or you feel dizzy, faint or short of breath.

## 2020-01-12 NOTE — CONSULT NOTE ADULT - SUBJECTIVE AND OBJECTIVE BOX
39 yo  s/p c/s on  with hx of preeclampsia with SF's prior to delivery and necessitating re-admission for magnesium; presenting for 1 day of vaginal bleeding. Says she is bleeding through one pad per hour. Started feeling dizzy/lightheaded today. After her first delivery she needed a D&C post op for continued bleeding. Had a period 2 months after delivery that was very heavy and she was started on OCPs by her ob/gyn. No longer on OCPs. Denies fainting. Denies nausea, vomiting, abdominal pain, leg edema, calf tenderness, fevers, chills.     POBHx: g1-g3: VTOP x2, MAB x1  g4: c/s 2017 urgent for NRFHT   g5: c/s repeat, for PEC with SF's, re-admitted for 24 hours of magnesium pod#5   PGyn: colpo , normal paps since, denies leep/cone, denies fibroids/cysts   PMH denies  PSH c/s x1  NKDA  Meds PNV     T(C): 36.4 (20 @ 15:52), Max: 36.4 (20 @ 15:52)  HR: 76 (20 @ 15:52) (76 - 76)  BP: 116/74 (20 @ 15:52) (116/74 - 116/74)  RR: 18 (20 @ 15:52) (18 - 18)  SpO2: 98% (20 @ 15:52) (98% - 98%)    PHYSICAL EXAM:   Gen: NAD  GI: soft, nontender, nondistended, no rebound no guarding  : normal external genitalia   BME: normal anteverted uterus, no adnexal masses appreciated, uterus normal size  Spec: cervix closed, no abnormal discharge, trickling vaginal bleeding, did no fill speculum   Ext: wnl        LABS:                        10.6   4.41  )-----------( 251      ( 2020 16:39 )             33.1         141  |  105  |  13  ----------------------------<  95  4.2   |  24  |  0.47<L>    Ca    9.4      2020 16:39    TPro  6.8  /  Alb  4.1  /  TBili  0.4  /  DBili  x   /  AST  21  /  ALT  11  /  AlkPhos  58  01-12            RADIOLOGY & ADDITIONAL STUDIES: 39 yo  s/p c/s on  with hx of preeclampsia with SF's prior to delivery and necessitating re-admission for magnesium; presenting for 1 day of vaginal bleeding. Says she is bleeding through one pad per hour. Started feeling dizzy/lightheaded today. After her first delivery she needed a D&C post op for continued bleeding. Had a period 2 months after delivery that was very heavy and she was started on OCPs by her ob/gyn. No longer on OCPs. Denies fainting. Denies nausea, vomiting, abdominal pain, leg edema, calf tenderness, fevers, chills.     POBHx: g1-g3: VTOP x2, MAB x1  g4: c/s 2017 urgent for NRFHT   g5: c/s repeat, for PEC with SF's, re-admitted for 24 hours of magnesium pod#5   PGyn: colpo , normal paps since, denies leep/cone, denies fibroids/cysts   PMH denies  PSH c/s x1  NKDA  Meds PNV     T(C): 36.4 (20 @ 15:52), Max: 36.4 (20 @ 15:52)  HR: 76 (20 @ 15:52) (76 - 76)  BP: 116/74 (20 @ 15:52) (116/74 - 116/74)  RR: 18 (20 @ 15:52) (18 - 18)  SpO2: 98% (20 @ 15:52) (98% - 98%)    PHYSICAL EXAM:   Gen: NAD  GI: soft, nontender, nondistended, no rebound no guarding  : normal external genitalia   BME: normal anteverted uterus, adnexal fullness felt on right, uterus normal size  Spec: cervix closed, no abnormal discharge, trickling vaginal bleeding, did no fill speculum   Ext: wnl        LABS:                        10.6   4.41  )-----------( 251      ( 2020 16:39 )             33.1         141  |  105  |  13  ----------------------------<  95  4.2   |  24  |  0.47<L>    Ca    9.4      2020 16:39    TPro  6.8  /  Alb  4.1  /  TBili  0.4  /  DBili  x   /  AST  21  /  ALT  11  /  AlkPhos  58  01-12            RADIOLOGY & ADDITIONAL STUDIES: 39 yo  s/p c/s on  with hx of preeclampsia with SF's prior to delivery and necessitating re-admission for magnesium; presenting for 1 day of vaginal bleeding. Says she is bleeding through one pad per hour. Started feeling dizzy/lightheaded today. After her first delivery she needed a D&C post op for continued bleeding. Had a period 2 months after delivery that was very heavy and she was started on OCPs by her ob/gyn. No longer on OCPs. Denies fainting. Denies nausea, vomiting, abdominal pain, leg edema, calf tenderness, fevers, chills.     POBHx: g1-g3: VTOP x2, MAB x1  g4: c/s 2017 urgent for NRFHT   g5: c/s repeat, for PEC with SF's, re-admitted for 24 hours of magnesium pod#5   PGyn: colpo , normal paps since, denies leep/cone, denies fibroids/cysts   PMH denies  PSH c/s x1  NKDA  Meds PNV     T(C): 36.4 (20 @ 15:52), Max: 36.4 (20 @ 15:52)  HR: 76 (20 @ 15:52) (76 - 76)  BP: 116/74 (20 @ 15:52) (116/74 - 116/74)  RR: 18 (20 @ 15:52) (18 - 18)  SpO2: 98% (20 @ 15:52) (98% - 98%)    PHYSICAL EXAM:   Gen: NAD  GI: soft, nontender, nondistended, no rebound no guarding  : normal external genitalia   BME: normal anteverted uterus, adnexal fullness felt on right, uterus normal size  Spec: cervix closed, no abnormal discharge, trickling vaginal bleeding, did no fill speculum   Ext: wnl        LABS:                        10.6   4.41  )-----------( 251      ( 2020 16:39 )             33.1         141  |  105  |  13  ----------------------------<  95  4.2   |  24  |  0.47<L>    Ca    9.4      2020 16:39    TPro  6.8  /  Alb  4.1  /  TBili  0.4  /  DBili  x   /  AST  21  /  ALT  11  /  AlkPhos  58  -            RADIOLOGY & ADDITIONAL STUDIES:  < from: US Transvaginal (20 @ 18:20) >  EXAM:  US PELVIC COMPLETE                          EXAM:  US TRANSVAGINAL                          PROCEDURE DATE:  2020      INTERPRETATION:  PELVIC ULTRASOUND dated 2020 6:20 PM    INDICATION: Irregular bleeding since delivery 6 months ago. Evaluate for retained products. LMP: Unclear due to postpartum bleeding for 6 months.    TECHNIQUE: Transabdominal views of the pelvis were obtained followed by transvaginal views for better visualization of the ovaries.      PRIOR STUDIES: None    FINDINGS:   These images demonstrate the uterus to be anteverted. The uterus is normal in size and shape.  The uterus is 8.6 x 4.2 x 5.5 cm.  No myometrial abnormalities are seen. A prior  scar is seen along the anterior lower uterinesegment. Small nabothian cysts are present.  The endometrium is 0.8 cm in thickness, which is normal.    The right ovary is enlarged, measuring 6.3 x 5.7 x 6.1 cm. There is a 5.8 x 5.2 x 5.6 cm cyst within the right ovary. The left ovary is normal insize, measuring 3.5 x 1.2 x 1.6 cm (seen on transabdominal images). No left ovarian masses are seen. Doppler evaluation demonstrates flow to both ovaries with no evidence of torsion.    Trace free fluid is seen in the cul-de-sac.    IMPRESSION:   1.No sonographic evidence of ovarian torsion.  2.  There is a 5.8 cm cyst within an enlarged right ovary. Given its size, yearly follow-up is recommended to confirm stability/resolution.    Thank you for the opportunity to participate in the care of this patient.    ELIJAH BARONE M.D., RADIOLOGY RESIDENT  This document has been electronically signed.  SHAHRAM CASTRO M.D., ATTENDING RADIOLOGIST  This document has been electronically signed. 2020  7:04PM        < end of copied text >

## 2020-01-12 NOTE — ED PROVIDER NOTE - OBJECTIVE STATEMENT
39F PMH PCOS, pre-DM p/w vaginal bleeding. Pt ~6mos postpartum - c/b preeclampsia and then subsequently requiring a D and C ~4mos ago for heavy vaginal bleeding. Periods irregular since then, now w/ heavy vaginal bleeding since yesterday, ~1pad an hr. Also w/ lightheaded/dizziness w/ standing - pt has hx of this but now more frequent/constant. Called her OB who referred to ED. Minimal chronic intermittent suprapubic pain. Denies HA, f/c, SOB/CP, NVD, urinary complaints, rashes, black/bloody stool, bleeding from elsewhere.   meds: victoza, adderall 39F PMH PCOS, pre-DM p/w vaginal bleeding. Pt ~6mos postpartum - c/b preeclampsia and then subsequently requiring OCPs ~4mos ago for heavy vaginal bleeding. Periods irregular since then, now w/ heavy vaginal bleeding since yesterday, ~1pad an hr. Also w/ lightheaded/dizziness w/ standing - pt has hx of this but now more frequent/constant. Called her OB who referred to ED. Minimal chronic intermittent suprapubic pain. Denies HA, f/c, SOB/CP, NVD, urinary complaints, rashes, black/bloody stool, bleeding from elsewhere.   meds: victoza, adderall

## 2020-01-12 NOTE — ED PROVIDER NOTE - CLINICAL SUMMARY MEDICAL DECISION MAKING FREE TEXT BOX
39F PMH PCOS, pre-DM p/w vaginal bleeding. Pt ~6mos postpartum - c/b preeclampsia and then subsequently requiring a D and C ~4mos ago for heavy vaginal bleeding. Periods irregular since then, now w/ heavy vaginal bleeding since yesterday, ~1pad an hr. Also w/ lightheaded/dizziness w/ standing - pt has hx of this but now more frequent/constant. Called her OB who referred to ED. Minimal chronic intermittent suprapubic pain. No other systemic symptoms. Vitals wnl, exam as above. Very well appearing.  OB resident already at bedside, minimal bleeding on exam  ddx: Likely normal periods.  basic labs, rediscuss w/ OB. Reassess. 39F PMH PCOS, pre-DM p/w vaginal bleeding. Pt ~6mos postpartum - c/b preeclampsia and then subsequently requiring OCPs ~4mos ago for heavy vaginal bleeding. Periods irregular since then, now w/ heavy vaginal bleeding since yesterday, ~1pad an hr. Also w/ lightheaded/dizziness w/ standing - pt has hx of this but now more frequent/constant. Called her OB who referred to ED. Minimal chronic intermittent suprapubic pain. No other systemic symptoms. Vitals wnl, exam as above. Very well appearing.  OB resident already at bedside, minimal bleeding on exam  ddx: Likely normal periods.  basic labs, rediscuss w/ OB. Reassess.

## 2020-01-12 NOTE — ED PROVIDER NOTE - PATIENT PORTAL LINK FT
You can access the FollowMyHealth Patient Portal offered by [facility] by registering at the following website: [email or web address]. By joining [facility]’s FollowMyHealth portal, you will also be able to view your health information using other applications (apps) compatible with our system.

## 2020-01-12 NOTE — ED ADULT NURSE NOTE - PRIMARY CARE PROVIDER
has OBGYN Number Of Freeze-Thaw Cycles: 2 freeze-thaw cycles Post-Care Instructions: I reviewed with the patient in detail post-care instructions. Patient is to wear sunprotection, and avoid picking at any of the treated lesions. Pt may apply Vaseline to crusted or scabbing areas. Render Post-Care Instructions In Note?: no Consent: The patient's consent was obtained including but not limited to risks of crusting, scabbing, blistering, scarring, darker or lighter pigmentary change, recurrence, incomplete removal and infection. Detail Level: Detailed Duration Of Freeze Thaw-Cycle (Seconds): 5

## 2020-01-12 NOTE — ED PROVIDER NOTE - PROGRESS NOTE DETAILS
Joliefish: Labs grossly at pt's baseline. US w/ cyst, no acute pathology. Clinically not torsion. d/w dr. rahman - offered pt admission as add on for possible D and C/hysteroscopy but no exact time frame for OR. Pt prefers to go home and f/u outpt w/ dr. rahman. Dr. rahman to prescribed PO TXA or OCP. Pt is hemodynamically stable w/ no significant bleeding in ED. Clinically no indication for further emergent ED workup or hospitalization at this time. comfortable for dc.

## 2020-01-14 VITALS
HEIGHT: 64 IN | TEMPERATURE: 98 F | OXYGEN SATURATION: 97 % | HEART RATE: 62 BPM | SYSTOLIC BLOOD PRESSURE: 94 MMHG | RESPIRATION RATE: 16 BRPM | WEIGHT: 172.4 LBS | DIASTOLIC BLOOD PRESSURE: 60 MMHG

## 2020-01-15 ENCOUNTER — RESULT REVIEW (OUTPATIENT)
Age: 40
End: 2020-01-15

## 2020-01-15 ENCOUNTER — OUTPATIENT (OUTPATIENT)
Dept: OUTPATIENT SERVICES | Facility: HOSPITAL | Age: 40
LOS: 1 days | Discharge: HOME | End: 2020-01-15
Payer: COMMERCIAL

## 2020-01-15 VITALS
RESPIRATION RATE: 16 BRPM | DIASTOLIC BLOOD PRESSURE: 55 MMHG | HEART RATE: 55 BPM | TEMPERATURE: 97 F | SYSTOLIC BLOOD PRESSURE: 118 MMHG | OXYGEN SATURATION: 99 %

## 2020-01-15 DIAGNOSIS — Z98.891 HISTORY OF UTERINE SCAR FROM PREVIOUS SURGERY: Chronic | ICD-10-CM

## 2020-01-15 DIAGNOSIS — Z41.9 ENCOUNTER FOR PROCEDURE FOR PURPOSES OTHER THAN REMEDYING HEALTH STATE, UNSPECIFIED: Chronic | ICD-10-CM

## 2020-01-15 DIAGNOSIS — Z90.89 ACQUIRED ABSENCE OF OTHER ORGANS: Chronic | ICD-10-CM

## 2020-01-15 PROCEDURE — 86901 BLOOD TYPING SEROLOGIC RH(D): CPT

## 2020-01-15 PROCEDURE — 88305 TISSUE EXAM BY PATHOLOGIST: CPT | Mod: 26

## 2020-01-15 PROCEDURE — 86850 RBC ANTIBODY SCREEN: CPT

## 2020-01-15 PROCEDURE — 58558 HYSTEROSCOPY BIOPSY: CPT

## 2020-01-15 PROCEDURE — 88305 TISSUE EXAM BY PATHOLOGIST: CPT

## 2020-01-15 PROCEDURE — 58662 LAPAROSCOPY EXCISE LESIONS: CPT

## 2020-01-15 RX ORDER — SIMETHICONE 80 MG/1
80 TABLET, CHEWABLE ORAL ONCE
Refills: 0 | Status: COMPLETED | OUTPATIENT
Start: 2020-01-15 | End: 2020-01-15

## 2020-01-15 RX ORDER — HYDROMORPHONE HYDROCHLORIDE 2 MG/ML
0.5 INJECTION INTRAMUSCULAR; INTRAVENOUS; SUBCUTANEOUS
Refills: 0 | Status: DISCONTINUED | OUTPATIENT
Start: 2020-01-15 | End: 2020-01-15

## 2020-01-15 RX ORDER — OXYCODONE HYDROCHLORIDE 5 MG/1
5 TABLET ORAL DAILY
Refills: 0 | Status: DISCONTINUED | OUTPATIENT
Start: 2020-01-15 | End: 2020-01-15

## 2020-01-15 RX ADMIN — HYDROMORPHONE HYDROCHLORIDE 0.5 MILLIGRAM(S): 2 INJECTION INTRAMUSCULAR; INTRAVENOUS; SUBCUTANEOUS at 16:35

## 2020-01-15 RX ADMIN — HYDROMORPHONE HYDROCHLORIDE 0.5 MILLIGRAM(S): 2 INJECTION INTRAMUSCULAR; INTRAVENOUS; SUBCUTANEOUS at 16:20

## 2020-01-15 RX ADMIN — SIMETHICONE 80 MILLIGRAM(S): 80 TABLET, CHEWABLE ORAL at 17:07

## 2020-01-15 NOTE — BRIEF OPERATIVE NOTE - NSICDXBRIEFPREOP_GEN_ALL_CORE_FT
PRE-OP DIAGNOSIS:  Cyst of ovary 15-Naren-2020 15:32:15  Tayla Perez  Abnormal uterine bleeding 15-Naren-2020 15:31:31  Tayla Perez

## 2020-01-15 NOTE — BRIEF OPERATIVE NOTE - NSICDXBRIEFPROCEDURE_GEN_ALL_CORE_FT
PROCEDURES:  Laparoscopic right ovarian cystectomy 15-Naren-2020 15:31:16  Tayla Perez  Diagnostic laparoscopy 15-Naren-2020 15:31:06  Tayla Perez  Hysteroscopy, with dilation and curettage 15-Naren-2020 15:30:42  Tayla Perez

## 2020-01-15 NOTE — PACU DISCHARGE NOTE - COMMENTS
pt met criteria for discharge- awake- alert and oriented x4- follow commands and moving all extremities- tolerating po intake- denies N/V- Pt experiences Pain relief after medication regimen- Pt ambulating to the PAcu and the bathroom with stand by assist- safety measures in place- discharge instruction explained to pt and spouse of pt -Pt verbalizes understanding- pt left unit ambulating with PCA and spouse to main lobby

## 2020-01-15 NOTE — BRIEF OPERATIVE NOTE - NSICDXBRIEFPOSTOP_GEN_ALL_CORE_FT
POST-OP DIAGNOSIS:  Right ovarian cyst 15-Naren-2020 15:33:04  Tayla Perez  Abnormal uterine bleeding 15-Naren-2020 15:32:27  Tayla Perez

## 2020-01-15 NOTE — BRIEF OPERATIVE NOTE - OPERATION/FINDINGS
Normal appearing vagina and cervix; hysteroscopy revealed normal appearing intrauterine cavity with bilateral tubal ostia visualized; normal appearing proliferative endometrium; diagnostic laparoscopy revealed adhesions of the omentum to the anterior abdominal wall, normal appearing uterus, normal left ovary with remanent of left fallopian tube visualized (consistent with prior surgical history); approximately 5 cm right ovarian cyst which appeared simple, normal appearing right fallopian tube; normal appearing liver.

## 2020-01-17 DIAGNOSIS — N93.9 ABNORMAL UTERINE AND VAGINAL BLEEDING, UNSPECIFIED: ICD-10-CM

## 2020-01-17 LAB — SURGICAL PATHOLOGY STUDY: SIGNIFICANT CHANGE UP

## 2020-07-15 NOTE — PATIENT PROFILE OB - BREAST MILK PROVIDES PROTECTION AGAINST INFECTION
[FreeTextEntry1] : Very pleasant 64-year-old gentleman who presents for follow-up of prostate cancer\par -Discussed the usual indolent nature of Paz 6 prostate cancer\par -Labs reviewed\par -We discussed the indication for a prostate biopsy for active surveillance for prostate cancer\par -I discussed the need to perform a transrectal ultrasound-guided prostate biopsy with the patient. I discussed the risks of a prostate biopsy with the patient, including but not limited to pain, rectal bleeding, blood in the urine and semen, difficulty or inability to urinate, and infection. We discussed the procedure itself, including the need to place a transrectal ultrasound probe in the rectum and take systematic biopsies of the prostate gland after providing a local anesthetic agent.  I explained the ruchi-procedural preparations that the patient will need to take, including self-administration of an enema the day of the biopsy. He wishes to proceed and we will schedule the biopsy for the near future.
Statement Selected

## 2021-02-23 NOTE — ED PROVIDER NOTE - CCCP TRG CHIEF CMPLNT
How Severe Is Your Skin Lesion?: mild Has Your Skin Lesion Been Treated?: not been treated Is This A New Presentation, Or A Follow-Up?: Skin Lesion headache

## 2021-07-22 ENCOUNTER — TRANSCRIPTION ENCOUNTER (OUTPATIENT)
Age: 41
End: 2021-07-22

## 2022-11-14 ENCOUNTER — NON-APPOINTMENT (OUTPATIENT)
Age: 42
End: 2022-11-14

## 2023-01-01 NOTE — ED ADULT TRIAGE NOTE - BP NONINVASIVE DIASTOLIC (MM HG)
CIRCUMCISION PHYSICIAN PROCEDURE NOTE    Pre-op Diagnosis: Elective Male Circumcision    Procedure: Elective Male Circumcision  Risks and benefits of procedure discussed with parent(s) prior to procedure per physician  Signed, informed consent for circumcision on chart  Identification bands checked    Positioning of Baby: On back - legs immobilized    Site prepared with: Betadine    \"Time Out\" completed and agreed upon by all team members present for correct patient identification, circumcision procedure (removal of penile foreskin), signed informed consent and provider performing procedure: Done     Anesthetic agent used: Ring block with 1% Lidocaine    Equipment for circumcision procedure: Gomco 1.3 cm    Specimens: Not applicable    Estimated blood loss: < 1 ml    Hemostasis: adequate    Complications: None     Dressing: Petroleum jelly     Procedure findings: Normal Genitalia    Post-op diagnosis: Elective Circumcision    Additional findings: Normal appearing  male external genitalia    Liliam Guerin MD  Obstetrician/Gynecologist       
75

## 2024-02-09 NOTE — CHART NOTE - NSCHARTNOTESELECT_GEN_ALL_CORE
Lifestyle Recommendations:  Eat 5-9 servings of vegetables each day (and some occasional fruit).  Eat 3 balanced meals per day and 2 snacks per day.  Eat at least 20-25 grams of protein per meal.  Avoid or minimize refined carbs (anything made with sugar, white flour, white     rice, white potatoes, regular pasta).  Exercise regularly.  Wear sunscreen (SPF 15-30 or greater) on sun exposed areas year-round.  Get 25-30 grams of fiber daily.  Aim for seven to eight hours of sleep nightly.  Always wear a seatbelt and motorcycle helmet.    Vitamins and Supplements:  I recommend that you get 2000 international units of Vitamin D3 per day.  You should be getting 1200 mg of calcium each day.  One serving of calcium (yogurt, 2 ounces of cheese, glass of milk) has about 250-300 mg of calcium.  Ideal source of calcium is dietary, but if needed a supplement of calcium must be taken together with the vitamin D.  You may take a multivitamin daily.  You may take an omega-3 supplement daily (fish or krill oil, flax/hemp/payal seeds).        Summary of your Discharge Medications            Accurate as of February 9, 2024  9:51 AM. Always use your most recent med list.                Take these Medications        Details   DULoxetine 30 MG capsule  Commonly known as: CYMBALTA   TAKE 1 CAPSULE BY MOUTH EVERY DAY     fluticasone 50 MCG/ACT nasal spray  Commonly known as: FLONASE   Spray 2 sprays in each nostril daily.     norgestimate-ethinyl estradiol (triphasic) 0.18/0.215/0.25 MG-35 MCG tablet  Commonly known as: ORTHO TRI-CYCLEN   Take 1 tablet by mouth daily.            
Event Note

## 2024-03-20 NOTE — PATIENT PROFILE OB - SKIN TO SKIN
Called patient back for results. Patient now taking Jardiance. Advised to restart Atorvastatin.     Sofi Peacock MD  PGY5/Endocrinology Fellow  
yes

## 2024-05-13 ENCOUNTER — EMERGENCY (EMERGENCY)
Facility: HOSPITAL | Age: 44
LOS: 1 days | Discharge: HOME | End: 2024-05-13
Attending: EMERGENCY MEDICINE
Payer: COMMERCIAL

## 2024-05-13 VITALS
DIASTOLIC BLOOD PRESSURE: 74 MMHG | OXYGEN SATURATION: 99 % | SYSTOLIC BLOOD PRESSURE: 106 MMHG | WEIGHT: 119.93 LBS | HEIGHT: 64 IN | HEART RATE: 101 BPM | TEMPERATURE: 98 F | RESPIRATION RATE: 20 BRPM

## 2024-05-13 VITALS
RESPIRATION RATE: 16 BRPM | TEMPERATURE: 98 F | SYSTOLIC BLOOD PRESSURE: 105 MMHG | HEART RATE: 66 BPM | DIASTOLIC BLOOD PRESSURE: 67 MMHG | OXYGEN SATURATION: 98 %

## 2024-05-13 DIAGNOSIS — Z98.891 HISTORY OF UTERINE SCAR FROM PREVIOUS SURGERY: Chronic | ICD-10-CM

## 2024-05-13 DIAGNOSIS — Z41.9 ENCOUNTER FOR PROCEDURE FOR PURPOSES OTHER THAN REMEDYING HEALTH STATE, UNSPECIFIED: Chronic | ICD-10-CM

## 2024-05-13 DIAGNOSIS — Z90.89 ACQUIRED ABSENCE OF OTHER ORGANS: Chronic | ICD-10-CM

## 2024-05-13 PROBLEM — N92.6 IRREGULAR MENSTRUATION, UNSPECIFIED: Chronic | Status: ACTIVE | Noted: 2020-01-14

## 2024-05-13 PROBLEM — N83.201 UNSPECIFIED OVARIAN CYST, RIGHT SIDE: Chronic | Status: ACTIVE | Noted: 2020-01-14

## 2024-05-13 LAB
ALBUMIN SERPL ELPH-MCNC: 4.4 G/DL — SIGNIFICANT CHANGE UP (ref 3.3–5)
ALP SERPL-CCNC: 65 U/L — SIGNIFICANT CHANGE UP (ref 40–120)
ALT FLD-CCNC: 14 U/L — SIGNIFICANT CHANGE UP (ref 10–45)
ANION GAP SERPL CALC-SCNC: 13 MMOL/L — SIGNIFICANT CHANGE UP (ref 5–17)
APPEARANCE UR: CLEAR — SIGNIFICANT CHANGE UP
AST SERPL-CCNC: 24 U/L — SIGNIFICANT CHANGE UP (ref 10–40)
BASE EXCESS BLDV CALC-SCNC: -1 MMOL/L — SIGNIFICANT CHANGE UP (ref -2–3)
BASOPHILS # BLD AUTO: 0.01 K/UL — SIGNIFICANT CHANGE UP (ref 0–0.2)
BASOPHILS NFR BLD AUTO: 0.2 % — SIGNIFICANT CHANGE UP (ref 0–2)
BILIRUB SERPL-MCNC: 0.8 MG/DL — SIGNIFICANT CHANGE UP (ref 0.2–1.2)
BILIRUB UR-MCNC: NEGATIVE — SIGNIFICANT CHANGE UP
BUN SERPL-MCNC: 14 MG/DL — SIGNIFICANT CHANGE UP (ref 7–23)
CA-I SERPL-SCNC: 1.21 MMOL/L — SIGNIFICANT CHANGE UP (ref 1.15–1.33)
CALCIUM SERPL-MCNC: 9.2 MG/DL — SIGNIFICANT CHANGE UP (ref 8.4–10.5)
CHLORIDE BLDV-SCNC: 101 MMOL/L — SIGNIFICANT CHANGE UP (ref 96–108)
CHLORIDE SERPL-SCNC: 103 MMOL/L — SIGNIFICANT CHANGE UP (ref 96–108)
CO2 BLDV-SCNC: 28 MMOL/L — HIGH (ref 22–26)
CO2 SERPL-SCNC: 22 MMOL/L — SIGNIFICANT CHANGE UP (ref 22–31)
COLOR SPEC: YELLOW — SIGNIFICANT CHANGE UP
CREAT SERPL-MCNC: 0.59 MG/DL — SIGNIFICANT CHANGE UP (ref 0.5–1.3)
DIFF PNL FLD: NEGATIVE — SIGNIFICANT CHANGE UP
EGFR: 115 ML/MIN/1.73M2 — SIGNIFICANT CHANGE UP
EOSINOPHIL # BLD AUTO: 0.06 K/UL — SIGNIFICANT CHANGE UP (ref 0–0.5)
EOSINOPHIL NFR BLD AUTO: 1 % — SIGNIFICANT CHANGE UP (ref 0–6)
GAS PNL BLDV: 137 MMOL/L — SIGNIFICANT CHANGE UP (ref 136–145)
GAS PNL BLDV: SIGNIFICANT CHANGE UP
GAS PNL BLDV: SIGNIFICANT CHANGE UP
GLUCOSE BLDV-MCNC: 86 MG/DL — SIGNIFICANT CHANGE UP (ref 70–99)
GLUCOSE SERPL-MCNC: 92 MG/DL — SIGNIFICANT CHANGE UP (ref 70–99)
GLUCOSE UR QL: NEGATIVE MG/DL — SIGNIFICANT CHANGE UP
HCG SERPL-ACNC: <2 MIU/ML — SIGNIFICANT CHANGE UP
HCO3 BLDV-SCNC: 26 MMOL/L — SIGNIFICANT CHANGE UP (ref 22–29)
HCT VFR BLD CALC: 37.7 % — SIGNIFICANT CHANGE UP (ref 34.5–45)
HCT VFR BLDA CALC: 36 % — SIGNIFICANT CHANGE UP (ref 34.5–46.5)
HGB BLD CALC-MCNC: 12.1 G/DL — SIGNIFICANT CHANGE UP (ref 11.7–16.1)
HGB BLD-MCNC: 12 G/DL — SIGNIFICANT CHANGE UP (ref 11.5–15.5)
IMM GRANULOCYTES NFR BLD AUTO: 0.3 % — SIGNIFICANT CHANGE UP (ref 0–0.9)
KETONES UR-MCNC: ABNORMAL MG/DL
LACTATE BLDV-MCNC: 1.5 MMOL/L — SIGNIFICANT CHANGE UP (ref 0.5–2)
LEUKOCYTE ESTERASE UR-ACNC: NEGATIVE — SIGNIFICANT CHANGE UP
LIDOCAIN IGE QN: 54 U/L — SIGNIFICANT CHANGE UP (ref 7–60)
LYMPHOCYTES # BLD AUTO: 0.86 K/UL — LOW (ref 1–3.3)
LYMPHOCYTES # BLD AUTO: 14.7 % — SIGNIFICANT CHANGE UP (ref 13–44)
MCHC RBC-ENTMCNC: 26.8 PG — LOW (ref 27–34)
MCHC RBC-ENTMCNC: 31.8 GM/DL — LOW (ref 32–36)
MCV RBC AUTO: 84.2 FL — SIGNIFICANT CHANGE UP (ref 80–100)
MONOCYTES # BLD AUTO: 0.36 K/UL — SIGNIFICANT CHANGE UP (ref 0–0.9)
MONOCYTES NFR BLD AUTO: 6.1 % — SIGNIFICANT CHANGE UP (ref 2–14)
NEUTROPHILS # BLD AUTO: 4.55 K/UL — SIGNIFICANT CHANGE UP (ref 1.8–7.4)
NEUTROPHILS NFR BLD AUTO: 77.7 % — HIGH (ref 43–77)
NITRITE UR-MCNC: NEGATIVE — SIGNIFICANT CHANGE UP
NRBC # BLD: 0 /100 WBCS — SIGNIFICANT CHANGE UP (ref 0–0)
PCO2 BLDV: 55 MMHG — HIGH (ref 39–42)
PH BLDV: 7.29 — LOW (ref 7.32–7.43)
PH UR: 5.5 — SIGNIFICANT CHANGE UP (ref 5–8)
PLATELET # BLD AUTO: 281 K/UL — SIGNIFICANT CHANGE UP (ref 150–400)
PO2 BLDV: 39 MMHG — SIGNIFICANT CHANGE UP (ref 25–45)
POTASSIUM BLDV-SCNC: 3.6 MMOL/L — SIGNIFICANT CHANGE UP (ref 3.5–5.1)
POTASSIUM SERPL-MCNC: 4.3 MMOL/L — SIGNIFICANT CHANGE UP (ref 3.5–5.3)
POTASSIUM SERPL-SCNC: 4.3 MMOL/L — SIGNIFICANT CHANGE UP (ref 3.5–5.3)
PROT SERPL-MCNC: 7.8 G/DL — SIGNIFICANT CHANGE UP (ref 6–8.3)
PROT UR-MCNC: NEGATIVE MG/DL — SIGNIFICANT CHANGE UP
RBC # BLD: 4.48 M/UL — SIGNIFICANT CHANGE UP (ref 3.8–5.2)
RBC # FLD: 14.5 % — SIGNIFICANT CHANGE UP (ref 10.3–14.5)
SAO2 % BLDV: 54.8 % — LOW (ref 67–88)
SODIUM SERPL-SCNC: 138 MMOL/L — SIGNIFICANT CHANGE UP (ref 135–145)
SP GR SPEC: 1.03 — SIGNIFICANT CHANGE UP (ref 1–1.03)
TROPONIN T, HIGH SENSITIVITY RESULT: <6 NG/L — SIGNIFICANT CHANGE UP (ref 0–51)
UROBILINOGEN FLD QL: 0.2 MG/DL — SIGNIFICANT CHANGE UP (ref 0.2–1)
WBC # BLD: 5.86 K/UL — SIGNIFICANT CHANGE UP (ref 3.8–10.5)
WBC # FLD AUTO: 5.86 K/UL — SIGNIFICANT CHANGE UP (ref 3.8–10.5)

## 2024-05-13 PROCEDURE — 85014 HEMATOCRIT: CPT

## 2024-05-13 PROCEDURE — 99285 EMERGENCY DEPT VISIT HI MDM: CPT | Mod: 25

## 2024-05-13 PROCEDURE — 81003 URINALYSIS AUTO W/O SCOPE: CPT

## 2024-05-13 PROCEDURE — 71046 X-RAY EXAM CHEST 2 VIEWS: CPT

## 2024-05-13 PROCEDURE — 84132 ASSAY OF SERUM POTASSIUM: CPT

## 2024-05-13 PROCEDURE — 82947 ASSAY GLUCOSE BLOOD QUANT: CPT

## 2024-05-13 PROCEDURE — 96374 THER/PROPH/DIAG INJ IV PUSH: CPT

## 2024-05-13 PROCEDURE — 76705 ECHO EXAM OF ABDOMEN: CPT

## 2024-05-13 PROCEDURE — 87086 URINE CULTURE/COLONY COUNT: CPT

## 2024-05-13 PROCEDURE — 80053 COMPREHEN METABOLIC PANEL: CPT

## 2024-05-13 PROCEDURE — 82803 BLOOD GASES ANY COMBINATION: CPT

## 2024-05-13 PROCEDURE — 82330 ASSAY OF CALCIUM: CPT

## 2024-05-13 PROCEDURE — 85018 HEMOGLOBIN: CPT

## 2024-05-13 PROCEDURE — 83605 ASSAY OF LACTIC ACID: CPT

## 2024-05-13 PROCEDURE — 83690 ASSAY OF LIPASE: CPT

## 2024-05-13 PROCEDURE — 84702 CHORIONIC GONADOTROPIN TEST: CPT

## 2024-05-13 PROCEDURE — 85025 COMPLETE CBC W/AUTO DIFF WBC: CPT

## 2024-05-13 PROCEDURE — 96375 TX/PRO/DX INJ NEW DRUG ADDON: CPT

## 2024-05-13 PROCEDURE — 84295 ASSAY OF SERUM SODIUM: CPT

## 2024-05-13 PROCEDURE — 76705 ECHO EXAM OF ABDOMEN: CPT | Mod: 26

## 2024-05-13 PROCEDURE — 93005 ELECTROCARDIOGRAM TRACING: CPT

## 2024-05-13 PROCEDURE — 82435 ASSAY OF BLOOD CHLORIDE: CPT

## 2024-05-13 PROCEDURE — 71046 X-RAY EXAM CHEST 2 VIEWS: CPT | Mod: 26

## 2024-05-13 PROCEDURE — 99284 EMERGENCY DEPT VISIT MOD MDM: CPT

## 2024-05-13 PROCEDURE — 96361 HYDRATE IV INFUSION ADD-ON: CPT

## 2024-05-13 PROCEDURE — 84484 ASSAY OF TROPONIN QUANT: CPT

## 2024-05-13 RX ORDER — KETOROLAC TROMETHAMINE 30 MG/ML
15 SYRINGE (ML) INJECTION ONCE
Refills: 0 | Status: DISCONTINUED | OUTPATIENT
Start: 2024-05-13 | End: 2024-05-13

## 2024-05-13 RX ORDER — SODIUM CHLORIDE 9 MG/ML
1000 INJECTION INTRAMUSCULAR; INTRAVENOUS; SUBCUTANEOUS ONCE
Refills: 0 | Status: COMPLETED | OUTPATIENT
Start: 2024-05-13 | End: 2024-05-13

## 2024-05-13 RX ORDER — ACETAMINOPHEN 500 MG
1000 TABLET ORAL ONCE
Refills: 0 | Status: COMPLETED | OUTPATIENT
Start: 2024-05-13 | End: 2024-05-13

## 2024-05-13 RX ORDER — FAMOTIDINE 10 MG/ML
20 INJECTION INTRAVENOUS ONCE
Refills: 0 | Status: COMPLETED | OUTPATIENT
Start: 2024-05-13 | End: 2024-05-13

## 2024-05-13 RX ADMIN — FAMOTIDINE 20 MILLIGRAM(S): 10 INJECTION INTRAVENOUS at 15:07

## 2024-05-13 RX ADMIN — SODIUM CHLORIDE 1000 MILLILITER(S): 9 INJECTION INTRAMUSCULAR; INTRAVENOUS; SUBCUTANEOUS at 15:06

## 2024-05-13 RX ADMIN — Medication 15 MILLIGRAM(S): at 15:07

## 2024-05-13 RX ADMIN — Medication 400 MILLIGRAM(S): at 16:53

## 2024-05-13 RX ADMIN — Medication 15 MILLIGRAM(S): at 15:40

## 2024-05-13 RX ADMIN — SODIUM CHLORIDE 1000 MILLILITER(S): 9 INJECTION INTRAMUSCULAR; INTRAVENOUS; SUBCUTANEOUS at 16:05

## 2024-05-13 NOTE — ED PROVIDER NOTE - CLINICAL SUMMARY MEDICAL DECISION MAKING FREE TEXT BOX
43-year-old female presenting for evaluation of epigastric abdominal pain rating to the back associated with nausea and diarrhea onset last night.  ddx includes but is not limited to pancreatitis vs acute iza vs pyelo vs colitis. plan for fluids, toradol, RUQ US, labs. Will consider CT pending findings 43-year-old female presenting for evaluation of epigastric abdominal pain rating to the back associated with nausea and diarrhea onset last night.  ddx includes but is not limited to pancreatitis vs acute iza vs pyelo vs colitis. plan for fluids, toradol, RUQ US, labs. Will consider CT pending findings  PMHx. of fallopian tube removal

## 2024-05-13 NOTE — ED PROVIDER NOTE - NSFOLLOWUPINSTRUCTIONS_ED_ALL_ED_FT
You were seen in the emergency department for abdominal pain. We have evaluated you and determined that you do not require further hospital interventions.    During your stay you had the following relevant results: a right upper quadrant ultrasound that does not show evidence of gallstones or cholecystitis, labwork that shows normal function of your pancreas    Please follow up with a GASTROENTEROLOGIST to discuss the results of your stay in our department.    You may continue to experience abdominal pain while at home. For your pain, you should take 20mg (1 tablet) of famotidine (brand name Pepcid) once a day. You may also take 1000mg (2 extra strength tablets) of acetaminophen (brand name Tylenol) every 6 hours. Do not take more than four doses in a day. You should avoid taking ibuprofen (brand names Motrin and Advil) as well as eating spicy or fatty foods as these may worsen your pain.    If you start to experience worsening symptoms such as persistent nausea or vomiting, inability to pass stool or gas, severe abdominal pain, bloody stool, please return to the emergency department for further evaluation.

## 2024-05-13 NOTE — ED PROVIDER NOTE - PATIENT PORTAL LINK FT
You can access the FollowMyHealth Patient Portal offered by [facility] by registering at the following website: [email or web address]. By joining [facility]’s FollowMyHealth portal, you will also be able to view your health information using other applications (apps) compatible with our system.

## 2024-05-13 NOTE — ED ADULT NURSE NOTE - CINV DISCH TEACH PARTICIP
No protocol for requested medication     Medication:    Disp Refills Start End    methylphenidate (METADATE CD) 30 MG CR capsule 30 capsule 0 10/17/2023 12/16/2023    Sig - Route: Take 1 capsule by mouth every morning. - Oral    Sent to pharmacy as: Methylphenidate HCl ER (CD) 30 MG Oral Capsule Extended Release (METADATE CD)    Class: Eprescribe    Earliest Fill Date: 10/17/2023    Notes to Pharmacy: Prescription expires 12/15/2023    E-Prescribing Status: Receipt confirmed by pharmacy (10/17/2023  9:41 AM CDT)        Last office visit date: 11/06/2023  Appointment: 01/09/2024  Last refill: 10/18/2023 per PDMP  Is the patient due for refill of this medication(s): Yes, with fill date 11/17/2023  PDMP review: Criteria met. Forwarded to Physician/RADHA for signature.   Pharmacy: [id number] - [place], [place] - [address]    Order pended, routed to clinician for review.     
Patient/Spouse

## 2024-05-13 NOTE — ED PROVIDER NOTE - OBJECTIVE STATEMENT
[facility] - Intensive Care ([place] [id number])  Cardiac Electrophysiology  Consult Note    Admission Date: 12/22/2023  Code Status: Full Code   Attending Provider: Sherri Vanessa MD  Consulting Provider: Shaina lEi MD  Principal Problem:Acute diastolic (congestive) heart failure    Inpatient consult to Cardiology  Consult performed by: Shaina Eli MD  Consult ordered by: Sherri Vanessa MD        Subjective:     Chief Complaint:  SAMSON      HPI:   71 y.o. male with history of HTN, HLD, DM2 , PAD, HCC s/p TACE/RFA (12/2020), and persistent AF ( s/p DCCV 5/2021 and 7/2021), CASTILLO cirrhosis OLT 1/6/2021 presented with complaints of fatigue and dyspnea.    He reports that he has been having progressive dyspnea over the last 3 days. At that time he had  episodes of chest tightness that has recurred with exertions since then.  Currently dyspnea present at rest, with minimal exertion - reporting walking 2-3 feet would cause dyspnea, he had orthopnea as well.  States he can't sleep on his side at home and has been getting up and sitting in his recliner.  Having difficulty sleeping and resulting fatigue from insomnia.   Orthopnea has improved after diuresis however still has dyspnea on exertion.      He is a former tobacco smoker, reports marijuana use(smoked) recently, no alcohol use       Echo showed severe reduced EF of 20-25%, moderate to severe low gradient aortic stenosis.        Past Medical History:   Diagnosis Date    Acute appendicitis 06/02/2023    Atrial fibrillation     Calculus of ureter 12/22/2023    Cholangiocarcinoma 03/16/2022    Cirrhosis 11/23/2020    Diabetes mellitus, type 2     Diabetic neuropathy 11/24/2020    Disorder of kidney and ureter     HTN (hypertension) 11/23/2020    Hyperlipidemia 11/23/2020    Kidney stones 11/23/2020    S/p lithotripsy 2020    Leukocytosis 01/15/2021    Liver mass 11/23/2020    CASTILLO (nonalcoholic steatohepatitis) 11/23/2020    Nausea and vomiting  08/21/2023    Other ascites 03/16/2022    PAD (peripheral artery disease)     Portal hypertension 11/23/2020    Skin cancer 11/23/2020       Past Surgical History:   Procedure Laterality Date    COLONOSCOPY  10/2020    ESOPHAGEAL MANOMETRY WITH MEASUREMENT OF IMPEDANCE N/A 7/17/2023    Procedure: MANOMETRY, ESOPHAGUS, WITH IMPEDANCE MEASUREMENT;  Surgeon: Klarissa Zamora MD;  Location: [facility] ENDO (4TH FLR);  Service: Gastroenterology;  Laterality: N/A;  pt diabetic  liver txp  prep insructions sent to pt via email and portal -Jm    ESOPHAGOGASTRODUODENOSCOPY  10/2020    ESOPHAGOGASTRODUODENOSCOPY N/A 7/1/2021    Procedure: EGD (ESOPHAGOGASTRODUODENOSCOPY);  Surgeon: Jovan David MD;  Location: [facility] ENDO (4TH FLR);  Service: Endoscopy;  Laterality: N/A;  HCC. Listed for liver transplant. EGD for variceal surveillance.  cardiac clearance and blood thinenr approval received, see telephone encounter 6/23/21-BB  fully vaccinated-BB  labs same day of procedure-BB    EXCISION OF HYDROCELE Right     hemorroid surgery      hemorroidectomy      KIDNEY STONE SURGERY      LAPAROSCOPIC APPENDECTOMY N/A 6/2/2023    Procedure: APPENDECTOMY, LAPAROSCOPIC;  Surgeon: Shan Ross MD;  [facility];  Service: General;  Laterality: N/A;    LEFT HEART CATHETERIZATION N/A 1/27/2021    Procedure: Left heart cath;  Surgeon: Kris Shelton MD;  Location: [facility] CATH LAB;  Service: Cardiology;  Laterality: N/A;    liver mass removal      LIVER TRANSPLANT N/A 1/6/2022    Procedure: TRANSPLANT, LIVER;  Surgeon: Lizet Garcia MD;  [facility];  Service: Transplant;  Laterality: N/A;    RIGHT HEART CATHETERIZATION Right 5/7/2021    Procedure: INSERTION, CATHETER, RIGHT HEART;  Surgeon: Jaden Schuster MD;  Location: [facility] CATH LAB;  Service: Cardiology;  Laterality: Right;    TREATMENT OF CARDIAC ARRHYTHMIA N/A 5/19/2021    Procedure: CARDIOVERSION;  Surgeon: Didier Tilley MD;  Location: [facility] EP LAB;   Service: Cardiology;  Laterality: N/A;  a fib, dccv/johny, mac, dm. sscu    TREATMENT OF CARDIAC ARRHYTHMIA N/A 5/31/2021    Procedure: CARDIOVERSION;  Surgeon: Daniel Arambula MD;  Location: [facility] EP LAB;  Service: Cardiology;  Laterality: N/A;  afib, DCCV, anest., DM, 3 prep    TREATMENT OF CARDIAC ARRHYTHMIA N/A 7/7/2021    Procedure: Cardioversion or Defibrillation;  Surgeon: Didier Tilley MD;  Location: [facility] EP LAB;  Service: Cardiology;  Laterality: N/A;  AF, JOHNY (cancel if complaint), DCCV, MAC, DM, 3 Prep    TREATMENT OF CARDIAC ARRHYTHMIA N/A 7/27/2021    Procedure: Cardioversion or Defibrillation;  Surgeon: Didier Tilley MD;  Location: [facility] EP LAB;  Service: Cardiology;  Laterality: N/A;  AF, JOHNY (cx if complaint), DCCV, MAC, DM, 3 Prep       Review of patient's allergies indicates:   Allergen Reactions    Bee pollens Swelling     BEE STINGS swells body       Current Facility-Administered Medications on File Prior to Encounter   Medication    sodium chloride 0.9% bolus 1,000 mL     Current Outpatient Medications on File Prior to Encounter   Medication Sig    amiodarone (PACERONE) 200 MG Tab Take 1 tablet (200 mg total) by mouth once daily.    amLODIPine (NORVASC) 10 MG tablet Take 10 mg by mouth once daily.    apixaban (ELIQUIS) 5 mg Tab Take 1 tablet (5 mg total) by mouth 2 (two) times daily.    buPROPion (WELLBUTRIN XL) 150 MG TB24 tablet Take 1 tablet (150 mg total) by mouth every morning.    gabapentin (NEURONTIN) 300 MG capsule Take 1 capsule (300 mg total) by mouth 3 (three) times daily as needed (neuropathy).    insulin (LANTUS SOLOSTAR U-100 INSULIN) glargine 100 units/mL SubQ pen INJECT 22 UNITS INTO THE SKIN EVERY EVENING. ADJUST DOSE UNTIL AM GLUCOSE GOAL . MAX DOSE 30 UNITS/DY    insulin lispro (HUMALOG KWIKPEN INSULIN) 100 unit/mL pen Inject 6 Units into the skin 3 (three) times daily with meals. Plus sliding scale, MDD: 48 units    magnesium oxide (MAG-OX) 400 mg (241.3 mg magnesium)  "tablet Take 2 tablets (800 mg total) by mouth 3 (three) times daily.    multivitamin capsule Take 1 capsule by mouth once daily.    omega-3 fatty acids/fish oil (FISH OIL-OMEGA-3 FATTY ACIDS) 300-1,000 mg capsule Take 1 capsule by mouth once daily.     ondansetron (ZOFRAN-ODT) 4 MG TbDL Dissolve 1 tablet (4 mg total) by mouth every 8 (eight) hours as needed (nausea).    pantoprazole (PROTONIX) 40 MG tablet Take 1 tablet (40 mg total) by mouth once daily.    QUEtiapine (SEROQUEL) 100 MG Tab Take 1 tablet (100 mg total) by mouth every evening.    sertraline (ZOLOFT) 25 MG tablet Take 1 tablet (25 mg total) by mouth once daily.    tacrolimus (PROGRAF) 0.5 MG Cap Take 2 capsules (1 mg total) by mouth every 12 (twelve) hours.    aspirin (ECOTRIN) 81 MG EC tablet Take 81 mg by mouth once daily.    blood sugar diagnostic Strp To check BG 2 times daily, to use with insurance preferred meter (patient has a [employer] self-monitoring glucose meter)    blood-glucose meter kit To check BG 2 times daily, to use with insurance preferred meter    calcium carbonate-vitamin D3 600 mg-20 mcg (800 unit) Tab Take 1 tablet by mouth once daily.    lancets Misc To check BG 2 times daily, to use with insurance preferred meter    pen needle, diabetic (BD ULTRA-FINE GÉNESIS PEN NEEDLE) 32 gauge x 5/32" Ndle Use to inject insulin 4 times daily     Family History       Problem Relation (Age of Onset)    Coronary artery disease Father          Tobacco Use    Smoking status: Former     Current packs/day: 0.00     Types: Cigarettes     Quit date: 1980     Years since quittin.9    Smokeless tobacco: Former   Substance and Sexual Activity    Alcohol use: Not Currently    Drug use: Never    Sexual activity: Not on file     Review of Systems   Constitutional:  Positive for activity change. Negative for appetite change, chills and fever.   Respiratory:  Positive for shortness of breath. Negative for wheezing.    Cardiovascular:  Negative for " 43-year-old female hx of ADHD, depression, and anxiety, presenting for evaluation of epigastric abdominal pain radiating to the RUQ/LUQ and back associated with nausea and diarrhea onset last night.  Patient denying any fever, dysuria, hematuria, chest pain, shortness of breath. Has not taken anything for the pain. no recent alcohol use or tobacco use. chest pain and leg swelling.   Gastrointestinal:  Negative for abdominal pain, nausea and vomiting.   Genitourinary: Negative.    Musculoskeletal: Negative.    Skin: Negative.    Neurological:  Positive for weakness.     Objective:     Vital Signs (Most Recent):  Temp: 98.4 °F (36.9 °C) (12/24/23 2308)  Pulse: 106 (12/24/23 2308)  Resp: 17 (12/24/23 2308)  BP: 124/75 (12/24/23 2308)  SpO2: 95 % (12/24/23 2308) Vital Signs (24h Range):  Temp:  [98 °F (36.7 °C)-98.7 °F (37.1 °C)] 98.4 °F (36.9 °C)  Pulse:  [101-111] 106  Resp:  [16-18] 17  SpO2:  [92 %-96 %] 95 %  BP: ()/(52-75) 124/75     Weight: 99.8 kg (220 lb)  Body mass index is 29.84 kg/m².     Physical Exam  Vitals and nursing note reviewed.   Constitutional:       General: He is not in acute distress.  HENT:      Head: Normocephalic and atraumatic.   Eyes:      General: No scleral icterus.     Pupils: Pupils are equal, round, and reactive to light.   Cardiovascular:      Rate and Rhythm: Normal rate.      Pulses: Normal pulses.      Heart sounds: Murmur (systolic) heard.   Pulmonary:      Effort: Pulmonary effort is normal. No respiratory distress.      Breath sounds: Rales present. No wheezing.   Abdominal:      General: Bowel sounds are normal. There is no distension.      Palpations: Abdomen is soft.      Tenderness: There is no abdominal tenderness. There is no guarding.   Musculoskeletal:      Cervical back: Neck supple.      Right lower leg: No edema.      Left lower leg: No edema.   Skin:     General: Skin is warm and dry.   Neurological:      General: No focal deficit present.      Mental Status: He is alert.              CRANIAL NERVES     CN III, IV, VI   Pupils are equal, round, and reactive to light.       Significant Labs: All pertinent labs within the past 24 hours have been reviewed.  CMP:   Recent Labs   Lab 12/23/23  0320 12/24/23  0502    138   K 4.1 4.1    103   CO2 25 27   * 136*   BUN 18 21   CREATININE 1.2 1.2  "  CALCIUM 8.7 8.6*   ANIONGAP 10 8       Cardiac Markers:   No results for input(s): "CKMB", "MYOGLOBIN", "BNP", "TROPISTAT" in the last 48 hours.    Coagulation: No results for input(s): "PT", "INR", "APTT" in the last 48 hours.  Urine Studies:   No results for input(s): "COLORU", "APPEARANCEUA", "PHUR", "SPECGRAV", "PROTEINUA", "GLUCUA", "KETONESU", "BILIRUBINUA", "OCCULTUA", "NITRITE", "UROBILINOGEN", "LEUKOCYTESUR", "RBCUA", "WBCUA", "BACTERIA", "SQUAMEPITHEL", "HYALINECASTS" in the last 48 hours.    Invalid input(s): "WRIGHTSUR"      Significant Imaging: I have reviewed all pertinent imaging results/findings within the past 24 hours.    XR CHEST AP PORTABLE     CLINICAL HISTORY:  Shortness of breath     TECHNIQUE:  Single frontal view of the chest was performed.     COMPARISON:  Chest radiograph 06/01/2023, CT chest 09/18/2023     FINDINGS:  Monitoring leads overlie the chest.  Patient is rotated.  Resolution is limited by body habitus with underpenetration.     Cardiomediastinal silhouette is midline and prominent similar to prior.  Scattered linear opacities throughout the lungs consistent with minimal platelike scarring versus atelectasis.  Few scattered new subtle patchy subsegmental opacities at the bilateral mid to lower lung zones.  The lungs are otherwise well expanded without consolidation, pleural effusion or pneumothorax.  No acute osseous process seen.  PA and lateral views can be obtained.     Impression:     Bilateral mid to lower lung zone new patchy subtle opacities which may reflect subsegmental atelectasis versus multifocal airspace process including aspiration or pneumonia in the proper clinical setting.  Consider short-term follow-up chest radiography after therapy to ensure resolution.        Electronically signed by: Stan Braun MD  Date:                                            12/22/2023  Time:                                           19:39  Review of Systems   Constitutional: " Positive for activity change. Negative for appetite change, chills and fever.   Cardiovascular:  Negative for chest pain and leg swelling.   Respiratory:  Positive for shortness of breath. Negative for wheezing.    Skin: Negative.    Musculoskeletal: Negative.    Gastrointestinal:  Negative for abdominal pain, nausea and vomiting.   Genitourinary: Negative.    Neurological:  Positive for weakness.     Physical Exam  Vitals and nursing note reviewed.   Constitutional:       General: He is not in acute distress.  HENT:      Head: Normocephalic and atraumatic.   Eyes:      General: No scleral icterus.     Pupils: Pupils are equal, round, and reactive to light.   Cardiovascular:      Rate and Rhythm: Normal rate.      Pulses: Normal pulses.      Heart sounds: Murmur (systolic) heard.   Pulmonary:      Effort: Pulmonary effort is normal. No respiratory distress.      Breath sounds: Rales present. No wheezing.   Abdominal:      General: Bowel sounds are normal. There is no distension.      Palpations: Abdomen is soft.      Tenderness: There is no abdominal tenderness. There is no guarding.   Musculoskeletal:      Cervical back: Neck supple.      Right lower leg: No edema.      Left lower leg: No edema.   Skin:     General: Skin is warm and dry.   Neurological:      General: No focal deficit present.      Mental Status: He is alert.                 Assessment and Plan:     HFrEF (heart failure with reduced ejection fraction)  New onset HFrEF  Patient is identified as having Combined Systolic and Diastolic heart failure that is Acute. CHF is currently uncontrolled due to >3 pillow orthopnea, Rales/crackles on pulmonary exam, and Pulmonary edema/pleural effusion on CXR. Latest ECHO performed and demonstrates- Results for orders placed during the hospital encounter of 12/22/23    Echo    Interpretation Summary    Left Ventricle: The left ventricle is normal in size. Mildly increased wall thickness. There is concentric  remodeling. There is severely reduced systolic function with a visually estimated ejection fraction of 20 - 25%. All walls except for the basal septum are hypokinetic. When directly compared to images from 7/5/2022 there has been a significant change in left ventricular systolic function.    LV Diastolic function: Unable to assess diastolic function due to E-A fusion. Average E/e' ratio is 11.    Right Ventricle: Normal right ventricular cavity size. Wall thickness is normal. Right ventricle wall motion  is normal. Systolic function is normal.    Left Atrium: Left atrium is severely dilated.    Aortic Valve: There is moderate to severe low gradient aortic stenosis. Aortic valve area by VTI is 0.97 cm². LVOT diameter is 2.4 cm. Aortic valve peak velocity is 3.24 m/s. Mean gradient is 28 mmHg. The dimensionless index is 0.21. There is no significant regurgitation.    Mitral Valve: There is mild to moderate regurgitation.    Tricuspid Valve: There is mild to moderate regurgitation.    Pulmonary Artery: The estimated pulmonary artery systolic pressure is 38 mm Hg.    IVC/SVC: Low central venous pressure.    Pericardium: There is a very small circumferential effusion; slightly more fluid is located laterally.    -- Start Entresto 24-26 mg BID   -- Start Toprol 25 mg   -- continue Lasix   -- IC consult for ischemic workup in setting of recurrent SAMSON while IP with newly reduced EF.    Monitor on telemetry   Monitor strict Is&Os and daily weights.  Place on fluid restriction of 1.5 L.  Continue to stress to patient importance of self efficacy and  on diet for CHF. Last BNP reviewed- and noted below   Recent Labs   Lab 12/22/23  1840   *   .          Persistent atrial fibrillation  - Maintain K > 4, Mag > 2 and Ca/iCal WNL to decrease arrhythmogenic potential  - Rate control with Toprol 25 mg   - Rhythm control with Amio 200 ( home dose)   - Anticoagulation with Eliquis    --> [id number] score 4   - Maintain  on telemetry and daily morning EKGs for the next few days          Thank you for your consult. I will follow-up with patient. Please contact us if you have any additional questions.    Shaina Eli MD  Cardiac Electrophysiology  [facility] - Intensive Care ([place]       43-year-old female hx of ADHD, depression, and anxiety, presenting for evaluation of epigastric abdominal pain radiating to the RUQ/LUQ and back associated with nausea and diarrhea onset last night.  Patient denying any fever, dysuria, hematuria, chest pain, shortness of breath. Has not taken anything for the pain. no recent tobacco use. Had a couple of drinks on Saturday night

## 2024-05-13 NOTE — ED PROVIDER NOTE - PHYSICAL EXAMINATION
GENERAL: well appearing in no acute distress  HEAD: normocephalic, atraumatic  HEENT: normal conjunctiva, oral mucosa moist,   CARDIAC: regular rate and rhythm, no appreciable murmurs, 2+ pulses in UE/LE b/l  PULM: normal breath sounds, clear to ascultation bilaterally, no rales, rhonchi, wheezing  GI: abdomen nondistended, soft, RUQ, epigastric, and LUQ ttp with no guarding, rebound tenderness  : R CVA tenderness, no suprapubic tenderness  NEURO: AAOx3  MSK: no peripheral edema, no calf tenderness b/l  SKIN: well-perfused, extremities warm, no visible rashes  PSYCH: appropriate mood and affect

## 2024-05-13 NOTE — ED PROVIDER NOTE - ATTENDING CONTRIBUTION TO CARE
I performed a history and physical exam of the patient and discussed their management with the resident. I reviewed the resident's note and agree with the documented findings and plan of care.  Gemini Kilgore MD

## 2024-05-13 NOTE — ED ADULT NURSE NOTE - OBJECTIVE STATEMENT
42 y/o F, pmh PCOS, psh c section, salpingectomy. presents to the ED from home with c/o of abd pain. Pt endorses 9/10 epigastric pain radiating to right back. pt states the pain started last night after dinner, associated s/s of mild nausea, 1 episode of diarrhea today, chills, mild dizziness. pt denies cp, fever, dysuria, hematuria, changes in vision, vomiting, headache at this time. abd soft, non distended, epigastric tenderness noted. pt OOB ind.

## 2024-05-13 NOTE — ED PROVIDER NOTE - PROGRESS NOTE DETAILS
Alma Henry MD, PGY2:  Reviewed results of labs and imaging thus far, labs are nonactionable, chest x-ray negative.  Pending ultrasound at this time.  Lipase is not elevated.  Patient informed of all findings.  All questions answered at this time. Nicolas Elizalde MD: I have been given all relevant clinical information regarding this patient and will be assuming care from the previous provider. Patient still has epigastric abdominal pain, does not want to stay for observation. Patient concerned about CXR, says there is a palpable mass in epigastrium, chest x-ray reviewed and shows no visible mass. RUQ US does not show gallstones or evidence of acute cholecystitis.

## 2024-05-14 LAB
CULTURE RESULTS: NO GROWTH — SIGNIFICANT CHANGE UP
SPECIMEN SOURCE: SIGNIFICANT CHANGE UP

## 2025-03-15 ENCOUNTER — NON-APPOINTMENT (OUTPATIENT)
Age: 45
End: 2025-03-15

## 2025-03-27 PROBLEM — Z78.9 OTHER SPECIFIED HEALTH STATUS: Chronic | Status: ACTIVE | Noted: 2024-05-13

## 2025-06-03 ENCOUNTER — APPOINTMENT (OUTPATIENT)
Age: 45
End: 2025-06-03
Payer: COMMERCIAL

## 2025-06-03 VITALS
DIASTOLIC BLOOD PRESSURE: 85 MMHG | RESPIRATION RATE: 16 BRPM | SYSTOLIC BLOOD PRESSURE: 128 MMHG | HEART RATE: 95 BPM | OXYGEN SATURATION: 100 % | HEIGHT: 63 IN | WEIGHT: 119 LBS | BODY MASS INDEX: 21.09 KG/M2 | TEMPERATURE: 98 F

## 2025-06-03 VITALS — DIASTOLIC BLOOD PRESSURE: 70 MMHG | SYSTOLIC BLOOD PRESSURE: 108 MMHG

## 2025-06-03 DIAGNOSIS — R93.1 ABNORMAL FINDINGS ON DIAGNOSTIC IMAGING OF HEART AND CORONARY CIRCULATION: ICD-10-CM

## 2025-06-03 DIAGNOSIS — Z00.00 ENCOUNTER FOR GENERAL ADULT MEDICAL EXAMINATION W/OUT ABNORMAL FINDINGS: ICD-10-CM

## 2025-06-03 PROCEDURE — G0136: CPT

## 2025-06-03 PROCEDURE — 99205 OFFICE O/P NEW HI 60 MIN: CPT

## 2025-06-03 RX ORDER — ROSUVASTATIN CALCIUM 5 MG/1
5 TABLET, FILM COATED ORAL
Qty: 90 | Refills: 3 | Status: ACTIVE | COMMUNITY
Start: 2025-06-03 | End: 1900-01-01

## 2025-06-30 ENCOUNTER — APPOINTMENT (OUTPATIENT)
Dept: INTERNAL MEDICINE | Facility: CLINIC | Age: 45
End: 2025-06-30
Payer: COMMERCIAL

## 2025-06-30 VITALS
TEMPERATURE: 96.5 F | OXYGEN SATURATION: 100 % | HEART RATE: 69 BPM | BODY MASS INDEX: 20.55 KG/M2 | WEIGHT: 116 LBS | HEIGHT: 63 IN | RESPIRATION RATE: 16 BRPM | DIASTOLIC BLOOD PRESSURE: 70 MMHG | SYSTOLIC BLOOD PRESSURE: 114 MMHG

## 2025-06-30 PROCEDURE — 99214 OFFICE O/P EST MOD 30 MIN: CPT

## 2025-07-01 ENCOUNTER — TRANSCRIPTION ENCOUNTER (OUTPATIENT)
Age: 45
End: 2025-07-01

## 2025-08-11 ENCOUNTER — EMERGENCY (EMERGENCY)
Facility: HOSPITAL | Age: 45
LOS: 1 days | End: 2025-08-11
Attending: EMERGENCY MEDICINE | Admitting: EMERGENCY MEDICINE
Payer: SELF-PAY

## 2025-08-11 VITALS
SYSTOLIC BLOOD PRESSURE: 116 MMHG | RESPIRATION RATE: 16 BRPM | DIASTOLIC BLOOD PRESSURE: 63 MMHG | OXYGEN SATURATION: 100 % | HEART RATE: 104 BPM | HEIGHT: 62 IN | WEIGHT: 115.08 LBS | TEMPERATURE: 97 F

## 2025-08-11 DIAGNOSIS — Z90.89 ACQUIRED ABSENCE OF OTHER ORGANS: Chronic | ICD-10-CM

## 2025-08-11 DIAGNOSIS — Z41.9 ENCOUNTER FOR PROCEDURE FOR PURPOSES OTHER THAN REMEDYING HEALTH STATE, UNSPECIFIED: Chronic | ICD-10-CM

## 2025-08-11 DIAGNOSIS — Z98.891 HISTORY OF UTERINE SCAR FROM PREVIOUS SURGERY: Chronic | ICD-10-CM

## 2025-08-11 PROCEDURE — 99283 EMERGENCY DEPT VISIT LOW MDM: CPT

## 2025-08-11 PROCEDURE — 99284 EMERGENCY DEPT VISIT MOD MDM: CPT

## 2025-08-11 PROCEDURE — 73130 X-RAY EXAM OF HAND: CPT

## 2025-08-11 PROCEDURE — 73130 X-RAY EXAM OF HAND: CPT | Mod: 26,50

## 2025-08-11 RX ORDER — ACETAMINOPHEN 500 MG/5ML
2 LIQUID (ML) ORAL
Qty: 18 | Refills: 0
Start: 2025-08-11 | End: 2025-08-13

## 2025-08-11 RX ORDER — ACETAMINOPHEN 500 MG/5ML
650 LIQUID (ML) ORAL ONCE
Refills: 0 | Status: COMPLETED | OUTPATIENT
Start: 2025-08-11 | End: 2025-08-11

## 2025-08-11 RX ORDER — CYCLOBENZAPRINE HYDROCHLORIDE 15 MG/1
1 CAPSULE, EXTENDED RELEASE ORAL
Qty: 1 | Refills: 0
Start: 2025-08-11 | End: 2025-08-13

## 2025-08-11 RX ADMIN — Medication 650 MILLIGRAM(S): at 14:48
